# Patient Record
Sex: FEMALE | Race: WHITE | NOT HISPANIC OR LATINO | Employment: FULL TIME | URBAN - METROPOLITAN AREA
[De-identification: names, ages, dates, MRNs, and addresses within clinical notes are randomized per-mention and may not be internally consistent; named-entity substitution may affect disease eponyms.]

---

## 2024-07-26 ENCOUNTER — LAB (OUTPATIENT)
Dept: LAB | Facility: LAB | Age: 59
End: 2024-07-26
Payer: COMMERCIAL

## 2024-07-26 ENCOUNTER — APPOINTMENT (OUTPATIENT)
Dept: RHEUMATOLOGY | Facility: CLINIC | Age: 59
End: 2024-07-26
Payer: COMMERCIAL

## 2024-07-26 VITALS — SYSTOLIC BLOOD PRESSURE: 138 MMHG | WEIGHT: 293 LBS | DIASTOLIC BLOOD PRESSURE: 73 MMHG

## 2024-07-26 DIAGNOSIS — L40.50 PSORIATIC ARTHRITIS (MULTI): ICD-10-CM

## 2024-07-26 DIAGNOSIS — L40.50 PSORIATIC ARTHRITIS (MULTI): Primary | ICD-10-CM

## 2024-07-26 LAB
ALBUMIN SERPL BCP-MCNC: 3.8 G/DL (ref 3.4–5)
ALP SERPL-CCNC: 108 U/L (ref 33–136)
ALT SERPL W P-5'-P-CCNC: 23 U/L (ref 7–45)
ANION GAP SERPL CALC-SCNC: 13 MMOL/L (ref 10–20)
AST SERPL W P-5'-P-CCNC: 29 U/L (ref 9–39)
BILIRUB SERPL-MCNC: 0.5 MG/DL (ref 0–1.2)
BUN SERPL-MCNC: 12 MG/DL (ref 6–23)
CALCIUM SERPL-MCNC: 9 MG/DL (ref 8.6–10.6)
CCP IGG SERPL-ACNC: <1 U/ML
CHLORIDE SERPL-SCNC: 102 MMOL/L (ref 98–107)
CO2 SERPL-SCNC: 32 MMOL/L (ref 21–32)
CREAT SERPL-MCNC: 0.71 MG/DL (ref 0.5–1.05)
CRP SERPL-MCNC: 1.84 MG/DL
EGFRCR SERPLBLD CKD-EPI 2021: >90 ML/MIN/1.73M*2
ERYTHROCYTE [DISTWIDTH] IN BLOOD BY AUTOMATED COUNT: 15.6 % (ref 11.5–14.5)
ERYTHROCYTE [SEDIMENTATION RATE] IN BLOOD BY WESTERGREN METHOD: 41 MM/H (ref 0–30)
GLUCOSE SERPL-MCNC: 127 MG/DL (ref 74–99)
HBV CORE IGM SER QL: NONREACTIVE
HBV SURFACE AG SERPL QL IA: NONREACTIVE
HCT VFR BLD AUTO: 43.9 % (ref 36–46)
HCV AB SER QL: NONREACTIVE
HGB BLD-MCNC: 14.1 G/DL (ref 12–16)
MCH RBC QN AUTO: 29.3 PG (ref 26–34)
MCHC RBC AUTO-ENTMCNC: 32.1 G/DL (ref 32–36)
MCV RBC AUTO: 91 FL (ref 80–100)
NRBC BLD-RTO: 0 /100 WBCS (ref 0–0)
PLATELET # BLD AUTO: 292 X10*3/UL (ref 150–450)
POTASSIUM SERPL-SCNC: 4.2 MMOL/L (ref 3.5–5.3)
PROT SERPL-MCNC: 7.1 G/DL (ref 6.4–8.2)
RBC # BLD AUTO: 4.81 X10*6/UL (ref 4–5.2)
RHEUMATOID FACT SER NEPH-ACNC: <10 IU/ML (ref 0–15)
SODIUM SERPL-SCNC: 143 MMOL/L (ref 136–145)
URATE SERPL-MCNC: 6 MG/DL (ref 2.3–6.7)
WBC # BLD AUTO: 7.7 X10*3/UL (ref 4.4–11.3)

## 2024-07-26 PROCEDURE — 80053 COMPREHEN METABOLIC PANEL: CPT

## 2024-07-26 PROCEDURE — 99204 OFFICE O/P NEW MOD 45 MIN: CPT | Performed by: INTERNAL MEDICINE

## 2024-07-26 PROCEDURE — 86705 HEP B CORE ANTIBODY IGM: CPT

## 2024-07-26 PROCEDURE — 85027 COMPLETE CBC AUTOMATED: CPT

## 2024-07-26 PROCEDURE — 36415 COLL VENOUS BLD VENIPUNCTURE: CPT

## 2024-07-26 PROCEDURE — 1036F TOBACCO NON-USER: CPT | Performed by: INTERNAL MEDICINE

## 2024-07-26 PROCEDURE — 86803 HEPATITIS C AB TEST: CPT

## 2024-07-26 PROCEDURE — 85652 RBC SED RATE AUTOMATED: CPT

## 2024-07-26 PROCEDURE — 86140 C-REACTIVE PROTEIN: CPT

## 2024-07-26 PROCEDURE — 87340 HEPATITIS B SURFACE AG IA: CPT

## 2024-07-26 PROCEDURE — 84550 ASSAY OF BLOOD/URIC ACID: CPT

## 2024-07-26 PROCEDURE — 86481 TB AG RESPONSE T-CELL SUSP: CPT

## 2024-07-26 PROCEDURE — 86200 CCP ANTIBODY: CPT

## 2024-07-26 PROCEDURE — 86431 RHEUMATOID FACTOR QUANT: CPT

## 2024-07-26 RX ORDER — METOPROLOL SUCCINATE 50 MG/1
50 TABLET, EXTENDED RELEASE ORAL DAILY
COMMUNITY

## 2024-07-26 RX ORDER — FUROSEMIDE 20 MG/1
20 TABLET ORAL DAILY
COMMUNITY

## 2024-07-26 RX ORDER — LEVOTHYROXINE SODIUM 125 UG/1
125 TABLET ORAL DAILY
COMMUNITY

## 2024-07-26 RX ORDER — FOLIC ACID 1 MG/1
1 TABLET ORAL DAILY
Qty: 30 TABLET | Refills: 1 | Status: SHIPPED | OUTPATIENT
Start: 2024-07-26 | End: 2024-08-25

## 2024-07-26 RX ORDER — ESCITALOPRAM OXALATE 20 MG/1
20 TABLET ORAL DAILY
COMMUNITY

## 2024-07-26 RX ORDER — METHOTREXATE 2.5 MG/1
15 TABLET ORAL
Qty: 24 TABLET | Refills: 1 | Status: SHIPPED | OUTPATIENT
Start: 2024-07-28 | End: 2024-08-27

## 2024-07-26 NOTE — PROGRESS NOTES
Subjective  . Mercedez Ellington is a 59 y.o. female who presents for New Patient Visit (Pain in joints ).    HPI.  59-year-old female with history of psoriasis, rosacea, HTN, hypothyroidism, obesity, endometrial CA s/p hysterectomy, arthritis s/p bilateral TKR and s/p right carpal tunnel decompression surgery presented for psoriatic arthritis evaluation.    She was diagnosed with psoriasis about 5 years ago.  She is using topical steroids and lotions.    She reports pain and stiffness in the hands that is worse in the morning for couple hours.  She reports pain in the hands up to 7/10 at times.  Sometimes she feels swelling of the fingers.  He reports mid back/lower back pain which is worse in the morning and at night.  Pain started about 2 years ago.  She also notes soreness and stiffness around the periarticular tissues and ligaments of the knees for the past 10 years.  She underwent right knee arthroplasty in March 2023 and left knee arthroplasty in August 2023.  However she has not noticed improvement of soft tissue soreness and swelling.    She stated that she noticed significant joint pain relief when she took prednisone for bronchitis about 3 months ago.     Social history: She is .  She does not smoke or drink.  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  Family history: Mother has had hypertension and abdominal hernia.  Father has diabetes.  A distant cousin has rheumatoid arthritis.    Review of Systems   All other systems reviewed and are negative.    Objective     Blood pressure 138/73, weight 147 kg (325 lb).    Physical Exam  Gen. AAO x3, NAD.  HEENT: No pallor or icterus, PERRLA, EOMI. Oropharynx is clear. MM moist,Parotid glands  not enlarged. No cervical lymphadenopathy .  Skin: Dry scaly and erythematous plaque at the right lateral dorsum of the hand.  Psoriatic rash involving the right elbow.    Heart: S1, S2/ RRR. No murmurs or gallops.  Lungs: CTA B.  Abdomen: Soft,  NT/ND, BS regular.  MSK: Bilateral DIP joint prominence with tenderness.  Right fourth and fifth finger PIP joint with tenderness.  Bilateral wrist with mild tenderness upon flexion and extension.  Right wrist with lateral joint line tenderness.  Bilateral shoulders with good range of motion.  Lumbar spine with tenderness.  SI joint without tenderness.  Bilateral ankle with lateral joint line tenderness.  Left first MTP with tenderness.    Neuro: CN II-XII intact. Sensation to touch intact.Strength 5/5 throughout. DTR 2+ and symmetrical.  Psych:Appropriate mood and behavior  EXT: No edema    Assessment/Plan . 59-year-old female with history of psoriasis, rosacea, HTN, hypothyroidism, obesity, endometrial CA s/p hysterectomy, arthritis s/p bilateral TKR and s/p right carpal tunnel decompression surgery presented with polyarthritis.    #1: Psoriatic arthritis.  She also has features of osteoarthritis.  -Patient was counseled regarding the diagnosis, management and outcomes in length.  -Obtain labs and x-ray of the hands and wrist.  -Begin methotrexate 15 mg/week.  Side effect discussed in length.  -Begin folic acid 1 mg daily.    Follow-up in 2 months.     This note was partially generated using the Dragon Voice recognition system. There may be some incorrect wording, spelling and/or spelling errors or punctuation errors that were not corrected prior to committing the note to the medical record.  Problem List Items Addressed This Visit    None  Visit Diagnoses       Psoriatic arthritis (Multi)    -  Primary    Relevant Medications    methotrexate (Trexall) 2.5 mg tablet (Start on 7/28/2024)    folic acid (Folvite) 1 mg tablet    Other Relevant Orders    Comprehensive Metabolic Panel    Citrulline Antibody, IgG    CBC    C-Reactive Protein    Hepatitis B Core Antibody, IgM    Hepatitis B Surface Antigen    Hepatitis C Antibody    T-Spot TB    Uric Acid    Sedimentation Rate    Rheumatoid Factor    XR hand 3+ views  bilateral    XR wrist 3+ views bilateral                 Active Ambulatory Problems     Diagnosis Date Noted    No Active Ambulatory Problems     Resolved Ambulatory Problems     Diagnosis Date Noted    No Resolved Ambulatory Problems     Past Medical History:   Diagnosis Date    Anxiety and depression     Endometrial carcinoma (Multi)     Hypertension     Hypothyroidism     Psoriasis        Family History   Problem Relation Name Age of Onset    Hypertension Mother      Diabetes Father         Past Surgical History:   Procedure Laterality Date    CARPAL TUNNEL RELEASE      CHOLECYSTECTOMY      HYSTERECTOMY      KNEE ARTHROPLASTY         Social History     Tobacco Use   Smoking Status Never   Smokeless Tobacco Never       Allergies  Ciprofloxacin, Morphine (pf), and Penicillin    Current Meds  Current Outpatient Medications   Medication Instructions    escitalopram (LEXAPRO) 20 mg, oral, Daily    folic acid (FOLVITE) 1 mg, oral, Daily    furosemide (LASIX) 20 mg, Daily    levothyroxine (SYNTHROID, LEVOXYL) 125 mcg, oral, Daily, Take on an empty stomach at the same time each day, either 30 to 60 minutes prior to breakfast    [START ON 7/28/2024] methotrexate (TREXALL) 15 mg, oral, Once Weekly, Follow directions carefully, and ask to explain any part you do not understand. Take exactly as directed.    metoprolol succinate XL (TOPROL-XL) 50 mg, oral, Daily, Do not crush or chew.          Dev Davidson MD

## 2024-07-26 NOTE — PATIENT INSTRUCTIONS
Take methotrexate 6 pills/week.  Take folic acid 1 mg daily.  Call me if any question.  Follow-up in 2 months.

## 2024-07-28 LAB
NIL(NEG) CONTROL SPOT COUNT: NORMAL
PANEL A SPOT COUNT: 0
PANEL B SPOT COUNT: 0
POS CONTROL SPOT COUNT: NORMAL
T-SPOT. TB INTERPRETATION: NEGATIVE

## 2024-09-14 DIAGNOSIS — L40.50 PSORIATIC ARTHRITIS (MULTI): ICD-10-CM

## 2024-09-16 RX ORDER — METHOTREXATE 2.5 MG/1
15 TABLET ORAL WEEKLY
Qty: 72 TABLET | Refills: 0 | Status: SHIPPED | OUTPATIENT
Start: 2024-09-16 | End: 2024-12-15

## 2024-09-25 ENCOUNTER — APPOINTMENT (OUTPATIENT)
Dept: RHEUMATOLOGY | Facility: CLINIC | Age: 59
End: 2024-09-25
Payer: COMMERCIAL

## 2024-09-25 VITALS
WEIGHT: 293 LBS | HEART RATE: 59 BPM | OXYGEN SATURATION: 93 % | BODY MASS INDEX: 45.99 KG/M2 | SYSTOLIC BLOOD PRESSURE: 136 MMHG | HEIGHT: 67 IN | DIASTOLIC BLOOD PRESSURE: 70 MMHG

## 2024-09-25 DIAGNOSIS — L40.50 PSORIATIC ARTHRITIS (MULTI): Primary | ICD-10-CM

## 2024-09-25 PROCEDURE — 3008F BODY MASS INDEX DOCD: CPT | Performed by: INTERNAL MEDICINE

## 2024-09-25 PROCEDURE — 1036F TOBACCO NON-USER: CPT | Performed by: INTERNAL MEDICINE

## 2024-09-25 PROCEDURE — 99213 OFFICE O/P EST LOW 20 MIN: CPT | Performed by: INTERNAL MEDICINE

## 2024-09-25 RX ORDER — FOLIC ACID 1 MG/1
1 TABLET ORAL
COMMUNITY
Start: 2024-08-29 | End: 2024-09-25 | Stop reason: SDUPTHER

## 2024-09-25 RX ORDER — METHOTREXATE 2.5 MG/1
15 TABLET ORAL WEEKLY
Qty: 72 TABLET | Refills: 0 | Status: SHIPPED | OUTPATIENT
Start: 2024-09-25 | End: 2024-12-24

## 2024-09-25 RX ORDER — FOLIC ACID 1 MG/1
1 TABLET ORAL
Qty: 90 TABLET | Refills: 0 | Status: SHIPPED | OUTPATIENT
Start: 2024-09-25 | End: 2024-12-24

## 2024-09-25 NOTE — PATIENT INSTRUCTIONS
Continue methotrexate 6 pills/week.  Take Advil as needed.  Call me if any question.  Follow-up in 2 months.

## 2024-09-25 NOTE — PROGRESS NOTES
"Subjective  . Mercedez Ellington is a 59 y.o. female who presents for Follow-up.    HPI. 59-year-old female with history of psoriasis, psoriatic arthritis, rosacea, HTN, hypothyroidism, obesity, endometrial CA s/p hysterectomy, arthritis s/p bilateral TKR and s/p right carpal tunnel decompression surgery presented for follow-up.    She states joint pain is better however still feels soreness of the CMC and DIP joints of both hands.  She is tolerating methotrexate.    Immunosuppression: Methotrexate. (7/26/2024).    Review of Systems   All other systems reviewed and are negative.    Objective     Blood pressure 136/70, pulse 59, height 1.702 m (5' 7\"), weight (!) 152 kg (335 lb), SpO2 93%.    Physical Exam.  Gen. AAO x3, NAD.  HEENT: No pallor or icterus, PERRLA, EOMI.  No cervical lymphadenopathy .  Skin: Dry scaly and erythematous plaque at the right lateral dorsum of the hand the right elbow.   Heart: S1, S2/ RRR.   Lungs: CTA B.  Abdomen: Soft, NT/ND.  MSK: Bilateral CMC slightly enlarged with tenderness.  Bilateral DIP joint with bony prominence and mild tenderness.  Bilateral wrist and elbows without swelling and tenderness.  Bilateral ankle and MTPs without swelling and tenderness.  Lumbar spine and SI joint without tenderness.    Neuro: Sensation to touch intact.Strength 5/5 throughout.   Psych:Appropriate mood and behavior  EXT: No edema    Assessment/Plan . 59-year-old female with history of psoriasis, psoriatic arthritis, rosacea, HTN, hypothyroidism, obesity, endometrial CA s/p hysterectomy, arthritis s/p bilateral TKR and s/p right carpal tunnel decompression surgery presented for follow-up.    #1: Psoriatic arthritis.  -Continue methotrexate 15 mg/week.  -Advil as needed.  -Labs.    Follow-up in 2 months.     This note was partially generated using the Dragon Voice recognition system. There may be some incorrect wording, spelling and/or spelling errors or punctuation errors that were not corrected prior to " committing the note to the medical record.      Problem List Items Addressed This Visit    None  Visit Diagnoses       Psoriatic arthritis (Multi)    -  Primary    Relevant Medications    methotrexate (Trexall) 2.5 mg tablet    folic acid (Folvite) 1 mg tablet    Other Relevant Orders    CBC    C-Reactive Protein    Sedimentation Rate    Hepatic Function Panel                 Active Ambulatory Problems     Diagnosis Date Noted    No Active Ambulatory Problems     Resolved Ambulatory Problems     Diagnosis Date Noted    No Resolved Ambulatory Problems     Past Medical History:   Diagnosis Date    Anxiety and depression     Endometrial carcinoma (Multi)     Hypertension     Hypothyroidism     Psoriasis        Family History   Problem Relation Name Age of Onset    Hypertension Mother      Diabetes Father         Past Surgical History:   Procedure Laterality Date    CARPAL TUNNEL RELEASE      CHOLECYSTECTOMY      HYSTERECTOMY      KNEE ARTHROPLASTY         Social History     Tobacco Use   Smoking Status Never   Smokeless Tobacco Never       Allergies  Ciprofloxacin, Morphine (pf), and Penicillin    Current Meds  Current Outpatient Medications   Medication Instructions    escitalopram (LEXAPRO) 20 mg, oral, Daily    folic acid (FOLVITE) 1 mg, oral, Daily (0630)    furosemide (LASIX) 20 mg, Daily    levothyroxine (SYNTHROID, LEVOXYL) 125 mcg, oral, Daily, Take on an empty stomach at the same time each day, either 30 to 60 minutes prior to breakfast    methotrexate (TREXALL) 15 mg, oral, Weekly    metoprolol succinate XL (TOPROL-XL) 50 mg, oral, Daily, Do not crush or chew.        Lab Results   Component Value Date    RF <10 07/26/2024    SEDRATE 41 (H) 07/26/2024    CRP 1.84 (H) 07/26/2024    URICACID 6.0 07/26/2024        Dev Davidson MD

## 2024-11-18 ENCOUNTER — HOSPITAL ENCOUNTER (OUTPATIENT)
Dept: RADIOLOGY | Facility: CLINIC | Age: 59
Discharge: HOME | End: 2024-11-18
Payer: COMMERCIAL

## 2024-11-18 DIAGNOSIS — L40.50 PSORIATIC ARTHRITIS (MULTI): ICD-10-CM

## 2024-11-18 PROCEDURE — 73110 X-RAY EXAM OF WRIST: CPT | Mod: 50

## 2024-11-18 PROCEDURE — 73130 X-RAY EXAM OF HAND: CPT | Mod: BILATERAL PROCEDURE | Performed by: RADIOLOGY

## 2024-11-18 PROCEDURE — 73130 X-RAY EXAM OF HAND: CPT | Mod: 50

## 2024-11-25 ENCOUNTER — APPOINTMENT (OUTPATIENT)
Dept: RHEUMATOLOGY | Facility: CLINIC | Age: 59
End: 2024-11-25
Payer: COMMERCIAL

## 2024-11-25 VITALS
SYSTOLIC BLOOD PRESSURE: 114 MMHG | DIASTOLIC BLOOD PRESSURE: 82 MMHG | HEIGHT: 67 IN | WEIGHT: 293 LBS | HEART RATE: 56 BPM | BODY MASS INDEX: 45.99 KG/M2

## 2024-11-25 DIAGNOSIS — L40.50 PSORIATIC ARTHRITIS (MULTI): Primary | ICD-10-CM

## 2024-11-25 PROCEDURE — 99213 OFFICE O/P EST LOW 20 MIN: CPT | Performed by: INTERNAL MEDICINE

## 2024-11-25 PROCEDURE — 3008F BODY MASS INDEX DOCD: CPT | Performed by: INTERNAL MEDICINE

## 2024-11-25 RX ORDER — CELECOXIB 200 MG/1
200 CAPSULE ORAL DAILY
Qty: 30 CAPSULE | Refills: 2 | Status: SHIPPED | OUTPATIENT
Start: 2024-11-25 | End: 2025-05-24

## 2024-11-25 NOTE — PROGRESS NOTES
"Subjective . Mercedez Ellington is a 59 y.o. female who presents for Follow-up (2 month follow up).    HPI.  59-year-old female with history of psoriasis, psoriatic arthritis, rosacea, HTN, hypothyroidism, obesity, endometrial CA s/p hysterectomy, arthritis s/p bilateral TKR and s/p right carpal tunnel decompression surgery presented for follow-up.     She reports pain in her spine, hips and CMC joints.  Left CMC joint hurts more than right.  Back and hip pain gets worse upon standing up, walking.  She states back pain feels like burning and achy most of the time.    Immunosuppression: Methotrexate.(7/26/2024).     X-ray of the hands: (11/2024).  Left hand: Redemonstration of mild arthritic changes of the 1st carpometacarpal  joint.    Right hand: Moderately advanced arthritis of the DIP joints of the 2nd, 3rd and  5th digits bilaterally.Redemonstration of arthritic changes of the 1st carpometacarpal  joints right side more severe than the left.    Review of Systems   All other systems reviewed and are negative.    Objective     Blood pressure 114/82, pulse 56, height 1.702 m (5' 7\"), weight (!) 151 kg (332 lb).    Physical Exam.  Gen. AAO x3, NAD.  HEENT: No pallor or icterus, PERRLA, EOMI. No cervical lymphadenopathy .  Skin:Dry scaly and erythematous plaque at the right lateral dorsum of the hand the right elbow .  Heart: S1, S2/ RRR.   Lungs: CTA B.  Abdomen: Soft, NT/ND.  MSK: Bilateral CMC squaring with positive grinding.  Right CMC with tenderness.  Bilateral DIP joints enlarged.  Bilateral middle finger DIP with mild tenderness.  Neck, lumbar spine without tenderness.  Bilateral SI joint with mild tenderness.  Bilateral Mandeep's negative.  Bilateral trochanteric bursa without tenderness.  Bilateral ankle without swelling and tenderness.    Neuro: Sensation to touch intact.Strength 5/5 throughout. .  Psych:Appropriate mood and behavior  EXT: No edema    Assessment/Plan . 59-year-old female with history of " psoriasis, psoriatic arthritis, rosacea, HTN, hypothyroidism, obesity, endometrial CA s/p hysterectomy, arthritis s/p bilateral TKR and s/p right carpal tunnel decompression surgery presented for follow-up.     #1: Psoriatic arthritis.  She also has features of degenerative arthritis.  -Not ready for Biologics.  -Begin Celebrex with food.  -Declined physical therapy referral.  Back exercises printout provided.  -Continue methotrexate.  -Weight loss discussed.    Follow-up in 3 months.     This note was partially generated using the Dragon Voice recognition system. There may be some incorrect wording, spelling and/or spelling errors or punctuation errors that were not corrected prior to committing the note to the medical record.              Problem List Items Addressed This Visit    None  Visit Diagnoses       Psoriatic arthritis (Multi)    -  Primary    Relevant Medications    celecoxib (CeleBREX) 200 mg capsule    Other Relevant Orders    C-Reactive Protein    CBC    Sedimentation Rate    HLAB27 Typing    XR lumbar spine 2-3 views    XR sacroiliac joints 3+ views                 Active Ambulatory Problems     Diagnosis Date Noted    No Active Ambulatory Problems     Resolved Ambulatory Problems     Diagnosis Date Noted    No Resolved Ambulatory Problems     Past Medical History:   Diagnosis Date    Anxiety and depression     Endometrial carcinoma (Multi)     Hypertension     Hypothyroidism     Psoriasis        Family History   Problem Relation Name Age of Onset    Hypertension Mother      Diabetes Father         Past Surgical History:   Procedure Laterality Date    CARPAL TUNNEL RELEASE      CHOLECYSTECTOMY      HYSTERECTOMY      KNEE ARTHROPLASTY         Social History     Tobacco Use   Smoking Status Never   Smokeless Tobacco Never       Allergies  Ciprofloxacin, Morphine (pf), and Penicillin    Current Meds  Current Outpatient Medications   Medication Instructions    celecoxib (CELEBREX) 200 mg, oral, Daily     escitalopram (LEXAPRO) 20 mg, Daily    folic acid (FOLVITE) 1 mg, oral, Daily (0630)    furosemide (LASIX) 20 mg, Daily    levothyroxine (SYNTHROID, LEVOXYL) 125 mcg, Daily    methotrexate (TREXALL) 15 mg, oral, Weekly    metoprolol succinate XL (TOPROL-XL) 50 mg, Daily        Lab Results   Component Value Date    RF <10 07/26/2024    SEDRATE 41 (H) 07/26/2024    CRP 1.84 (H) 07/26/2024    URICACID 6.0 07/26/2024             Dev Davidson MD

## 2024-11-25 NOTE — PATIENT INSTRUCTIONS
Take Celebrex with food.  Begin back exercises.  Take Tylenol as discussed.  Continue methotrexate as prescribed.  Conceded to join weight loss program.  Call me if any question.  Follow-up in 3 months.

## 2024-12-09 DIAGNOSIS — L40.50 PSORIATIC ARTHRITIS (MULTI): ICD-10-CM

## 2024-12-09 RX ORDER — METHOTREXATE 2.5 MG/1
15 TABLET ORAL WEEKLY
Qty: 72 TABLET | Refills: 0 | Status: SHIPPED | OUTPATIENT
Start: 2024-12-09 | End: 2025-03-09

## 2024-12-09 RX ORDER — FOLIC ACID 1 MG/1
1 TABLET ORAL
Qty: 90 TABLET | Refills: 0 | Status: SHIPPED | OUTPATIENT
Start: 2024-12-09 | End: 2025-03-09

## 2025-01-11 ENCOUNTER — HOSPITAL ENCOUNTER (EMERGENCY)
Facility: HOSPITAL | Age: 60
Discharge: HOME | End: 2025-01-12
Attending: EMERGENCY MEDICINE
Payer: COMMERCIAL

## 2025-01-11 ENCOUNTER — APPOINTMENT (OUTPATIENT)
Dept: RADIOLOGY | Facility: HOSPITAL | Age: 60
End: 2025-01-11
Payer: COMMERCIAL

## 2025-01-11 DIAGNOSIS — N39.0 URINARY TRACT INFECTION WITHOUT HEMATURIA, SITE UNSPECIFIED: ICD-10-CM

## 2025-01-11 DIAGNOSIS — N17.9 ACUTE KIDNEY INJURY (CMS-HCC): ICD-10-CM

## 2025-01-11 DIAGNOSIS — L03.115 CELLULITIS OF RIGHT LOWER EXTREMITY: Primary | ICD-10-CM

## 2025-01-11 LAB
ALBUMIN SERPL BCP-MCNC: 3.5 G/DL (ref 3.4–5)
ALP SERPL-CCNC: 97 U/L (ref 33–136)
ALT SERPL W P-5'-P-CCNC: 28 U/L (ref 7–45)
ANION GAP SERPL CALC-SCNC: 10 MMOL/L (ref 10–20)
APPEARANCE UR: ABNORMAL
AST SERPL W P-5'-P-CCNC: 42 U/L (ref 9–39)
BACTERIA #/AREA URNS AUTO: ABNORMAL /HPF
BASOPHILS # BLD AUTO: 0.05 X10*3/UL (ref 0–0.1)
BASOPHILS NFR BLD AUTO: 0.3 %
BILIRUB SERPL-MCNC: 0.8 MG/DL (ref 0–1.2)
BILIRUB UR STRIP.AUTO-MCNC: ABNORMAL MG/DL
BUN SERPL-MCNC: 20 MG/DL (ref 6–23)
CALCIUM SERPL-MCNC: 8.4 MG/DL (ref 8.6–10.3)
CHLORIDE SERPL-SCNC: 99 MMOL/L (ref 98–107)
CO2 SERPL-SCNC: 27 MMOL/L (ref 21–32)
COLOR UR: ABNORMAL
CREAT SERPL-MCNC: 1.38 MG/DL (ref 0.5–1.05)
EGFRCR SERPLBLD CKD-EPI 2021: 43 ML/MIN/1.73M*2
EOSINOPHIL # BLD AUTO: 0.1 X10*3/UL (ref 0–0.7)
EOSINOPHIL NFR BLD AUTO: 0.6 %
ERYTHROCYTE [DISTWIDTH] IN BLOOD BY AUTOMATED COUNT: 16.2 % (ref 11.5–14.5)
GLUCOSE SERPL-MCNC: 148 MG/DL (ref 74–99)
GLUCOSE UR STRIP.AUTO-MCNC: NORMAL MG/DL
HCT VFR BLD AUTO: 43.8 % (ref 36–46)
HGB BLD-MCNC: 14.6 G/DL (ref 12–16)
HYALINE CASTS #/AREA URNS AUTO: ABNORMAL /LPF
IMM GRANULOCYTES # BLD AUTO: 0.15 X10*3/UL (ref 0–0.7)
IMM GRANULOCYTES NFR BLD AUTO: 0.9 % (ref 0–0.9)
KETONES UR STRIP.AUTO-MCNC: NEGATIVE MG/DL
LEUKOCYTE ESTERASE UR QL STRIP.AUTO: ABNORMAL
LIPASE SERPL-CCNC: 6 U/L (ref 9–82)
LYMPHOCYTES # BLD AUTO: 0.64 X10*3/UL (ref 1.2–4.8)
LYMPHOCYTES NFR BLD AUTO: 4 %
MCH RBC QN AUTO: 30.7 PG (ref 26–34)
MCHC RBC AUTO-ENTMCNC: 33.3 G/DL (ref 32–36)
MCV RBC AUTO: 92 FL (ref 80–100)
MONOCYTES # BLD AUTO: 0.84 X10*3/UL (ref 0.1–1)
MONOCYTES NFR BLD AUTO: 5.3 %
MUCOUS THREADS #/AREA URNS AUTO: ABNORMAL /LPF
NEUTROPHILS # BLD AUTO: 14.19 X10*3/UL (ref 1.2–7.7)
NEUTROPHILS NFR BLD AUTO: 88.9 %
NITRITE UR QL STRIP.AUTO: NEGATIVE
NRBC BLD-RTO: 0 /100 WBCS (ref 0–0)
PH UR STRIP.AUTO: 5.5 [PH]
PLATELET # BLD AUTO: 202 X10*3/UL (ref 150–450)
POTASSIUM SERPL-SCNC: 4.1 MMOL/L (ref 3.5–5.3)
PROT SERPL-MCNC: 6.9 G/DL (ref 6.4–8.2)
PROT UR STRIP.AUTO-MCNC: ABNORMAL MG/DL
RBC # BLD AUTO: 4.76 X10*6/UL (ref 4–5.2)
RBC # UR STRIP.AUTO: ABNORMAL /UL
RBC #/AREA URNS AUTO: >20 /HPF
SODIUM SERPL-SCNC: 132 MMOL/L (ref 136–145)
SP GR UR STRIP.AUTO: 1.03
SQUAMOUS #/AREA URNS AUTO: ABNORMAL /HPF
UROBILINOGEN UR STRIP.AUTO-MCNC: ABNORMAL MG/DL
WBC # BLD AUTO: 16 X10*3/UL (ref 4.4–11.3)
WBC #/AREA URNS AUTO: >50 /HPF
WBC CLUMPS #/AREA URNS AUTO: ABNORMAL /HPF

## 2025-01-11 PROCEDURE — 99285 EMERGENCY DEPT VISIT HI MDM: CPT | Mod: 25 | Performed by: EMERGENCY MEDICINE

## 2025-01-11 PROCEDURE — 93971 EXTREMITY STUDY: CPT

## 2025-01-11 PROCEDURE — 74177 CT ABD & PELVIS W/CONTRAST: CPT

## 2025-01-11 PROCEDURE — 93971 EXTREMITY STUDY: CPT | Mod: FOREIGN READ | Performed by: RADIOLOGY

## 2025-01-11 PROCEDURE — 2500000004 HC RX 250 GENERAL PHARMACY W/ HCPCS (ALT 636 FOR OP/ED): Performed by: EMERGENCY MEDICINE

## 2025-01-11 PROCEDURE — 80053 COMPREHEN METABOLIC PANEL: CPT | Performed by: EMERGENCY MEDICINE

## 2025-01-11 PROCEDURE — 81001 URINALYSIS AUTO W/SCOPE: CPT | Performed by: EMERGENCY MEDICINE

## 2025-01-11 PROCEDURE — 2500000001 HC RX 250 WO HCPCS SELF ADMINISTERED DRUGS (ALT 637 FOR MEDICARE OP): Performed by: EMERGENCY MEDICINE

## 2025-01-11 PROCEDURE — 2550000001 HC RX 255 CONTRASTS: Performed by: EMERGENCY MEDICINE

## 2025-01-11 PROCEDURE — 85025 COMPLETE CBC W/AUTO DIFF WBC: CPT | Performed by: EMERGENCY MEDICINE

## 2025-01-11 PROCEDURE — 36415 COLL VENOUS BLD VENIPUNCTURE: CPT | Performed by: EMERGENCY MEDICINE

## 2025-01-11 PROCEDURE — 74177 CT ABD & PELVIS W/CONTRAST: CPT | Mod: FOREIGN READ | Performed by: RADIOLOGY

## 2025-01-11 PROCEDURE — 83690 ASSAY OF LIPASE: CPT | Performed by: EMERGENCY MEDICINE

## 2025-01-11 PROCEDURE — 87086 URINE CULTURE/COLONY COUNT: CPT | Mod: PORLAB | Performed by: EMERGENCY MEDICINE

## 2025-01-11 RX ORDER — ACETAMINOPHEN 325 MG/1
975 TABLET ORAL ONCE
Status: COMPLETED | OUTPATIENT
Start: 2025-01-11 | End: 2025-01-11

## 2025-01-11 RX ADMIN — IOHEXOL 75 ML: 350 INJECTION, SOLUTION INTRAVENOUS at 23:02

## 2025-01-11 RX ADMIN — ACETAMINOPHEN 975 MG: 325 TABLET ORAL at 21:43

## 2025-01-11 RX ADMIN — SODIUM CHLORIDE 1000 ML: 9 INJECTION, SOLUTION INTRAVENOUS at 23:12

## 2025-01-11 ASSESSMENT — LIFESTYLE VARIABLES
HAVE YOU EVER FELT YOU SHOULD CUT DOWN ON YOUR DRINKING: NO
EVER HAD A DRINK FIRST THING IN THE MORNING TO STEADY YOUR NERVES TO GET RID OF A HANGOVER: NO
TOTAL SCORE: 0
EVER FELT BAD OR GUILTY ABOUT YOUR DRINKING: NO
HAVE PEOPLE ANNOYED YOU BY CRITICIZING YOUR DRINKING: NO

## 2025-01-11 ASSESSMENT — PAIN DESCRIPTION - DESCRIPTORS: DESCRIPTORS: BURNING;TIGHTNESS

## 2025-01-11 ASSESSMENT — PAIN DESCRIPTION - PAIN TYPE: TYPE: ACUTE PAIN

## 2025-01-11 ASSESSMENT — COLUMBIA-SUICIDE SEVERITY RATING SCALE - C-SSRS
6. HAVE YOU EVER DONE ANYTHING, STARTED TO DO ANYTHING, OR PREPARED TO DO ANYTHING TO END YOUR LIFE?: NO
1. IN THE PAST MONTH, HAVE YOU WISHED YOU WERE DEAD OR WISHED YOU COULD GO TO SLEEP AND NOT WAKE UP?: NO
2. HAVE YOU ACTUALLY HAD ANY THOUGHTS OF KILLING YOURSELF?: NO

## 2025-01-11 ASSESSMENT — PAIN SCALES - GENERAL: PAINLEVEL_OUTOF10: 6

## 2025-01-11 ASSESSMENT — PAIN - FUNCTIONAL ASSESSMENT: PAIN_FUNCTIONAL_ASSESSMENT: 0-10

## 2025-01-11 ASSESSMENT — PAIN DESCRIPTION - LOCATION: LOCATION: LEG

## 2025-01-11 ASSESSMENT — PAIN DESCRIPTION - FREQUENCY: FREQUENCY: CONSTANT/CONTINUOUS

## 2025-01-12 VITALS
BODY MASS INDEX: 45.99 KG/M2 | TEMPERATURE: 97 F | HEART RATE: 60 BPM | OXYGEN SATURATION: 95 % | HEIGHT: 67 IN | DIASTOLIC BLOOD PRESSURE: 65 MMHG | RESPIRATION RATE: 16 BRPM | SYSTOLIC BLOOD PRESSURE: 127 MMHG | WEIGHT: 293 LBS

## 2025-01-12 LAB — HOLD SPECIMEN: NORMAL

## 2025-01-12 PROCEDURE — 2500000004 HC RX 250 GENERAL PHARMACY W/ HCPCS (ALT 636 FOR OP/ED): Performed by: EMERGENCY MEDICINE

## 2025-01-12 PROCEDURE — 2500000002 HC RX 250 W HCPCS SELF ADMINISTERED DRUGS (ALT 637 FOR MEDICARE OP, ALT 636 FOR OP/ED): Performed by: EMERGENCY MEDICINE

## 2025-01-12 PROCEDURE — 96365 THER/PROPH/DIAG IV INF INIT: CPT

## 2025-01-12 RX ORDER — SULFAMETHOXAZOLE AND TRIMETHOPRIM 800; 160 MG/1; MG/1
1 TABLET ORAL ONCE
Status: COMPLETED | OUTPATIENT
Start: 2025-01-12 | End: 2025-01-12

## 2025-01-12 RX ORDER — CEPHALEXIN 500 MG/1
500 CAPSULE ORAL 2 TIMES DAILY
Qty: 20 CAPSULE | Refills: 0 | Status: SHIPPED | OUTPATIENT
Start: 2025-01-12 | End: 2025-01-22

## 2025-01-12 RX ORDER — SULFAMETHOXAZOLE AND TRIMETHOPRIM 800; 160 MG/1; MG/1
1 TABLET ORAL 2 TIMES DAILY
Qty: 20 TABLET | Refills: 0 | Status: SHIPPED | OUTPATIENT
Start: 2025-01-12 | End: 2025-01-22

## 2025-01-12 RX ORDER — CEFTRIAXONE 2 G/50ML
2 INJECTION, SOLUTION INTRAVENOUS ONCE
Status: COMPLETED | OUTPATIENT
Start: 2025-01-12 | End: 2025-01-12

## 2025-01-12 RX ADMIN — SULFAMETHOXAZOLE AND TRIMETHOPRIM 1 TABLET: 800; 160 TABLET ORAL at 03:22

## 2025-01-12 RX ADMIN — CEFTRIAXONE SODIUM 2 G: 2 INJECTION, SOLUTION INTRAVENOUS at 01:30

## 2025-01-12 NOTE — ED TRIAGE NOTES
"Pt. to ED c/o right sided groin tenderness/soreness, as well as right leg pain.  Pt. stated that last night her leg felt like it was \"on fire,\" and today her leg is discolored--purplish/red, slightly swollen and warm to the touch. Pt. reports no hx of blood clots.  "

## 2025-01-12 NOTE — ED PROVIDER NOTES
HPI   Chief Complaint   Patient presents with    Right Groin Pain; Leg Pain       HPI  HISTORY OF PRESENT ILLNESS:  Patient is a 60-year-old female presenting to the emergency department with right lower quadrant abdominal pain and leg pain.  The patient states that she denies any injuries.  She started to notice pain in her right lower quadrant rating to the groin.  It felt like everything was on fire.  She will today woke up with her right lower extremity discolored.  Patient has not had prior blood clots.    Past Medical History: Psoriasis, psoriatic arthritis with the patient currently on Celebrex and methotrexate, rosacea, hypertension, hypothyroidism, obesity, endometrial cancer status post hysterectomy, arthritis status post bilateral knee replacement, carpal tunnel surgery  Family History: family history not pertinent to presenting problem or chief complaint  Social History: Denies current cigarette smoking or EtOH use    __________________________________________________________  PHYSICAL EXAM:    Appearance:  female, appears stated age, nontoxic-appearing, alert, oriented , cooperative   Skin: Purple discoloration to the right anterior calf region, no evidence of cellulitis, discharge.  Intact,  dry skin, no lesions, rash, petechiae or purpura.   Eyes: PERRLA, EOMs intact,  Conjunctiva pink with no redness or exudates.    HENT: Normocephalic, atraumatic. Nares patent   Neck: Supple. Trachea at midline.   Pulmonary: Lung sounds are clear bilaterally.  There is no rales, rhonchi, or wheezing.  Cardiac: Regular rate and rhythm, no rubs, murmurs, or gallops. No JVD,   Abdomen: Abdomen is soft, nontender, and nondistended.  No palpable organomegaly.  No rebound or guarding.  No CVA tenderness. Nonsurgical abdomen  Genitourinary: Exam deferred.  Musculoskeletal: no edema, pain, cyanosis, or deformity in extremities. Pulses full and equal.   Neurological:  Cranial nerves are grossly intact, grossly  normal sensation, no weakness, no focal findings identified.    __________________________________________________________  MEDICAL DECISION MAKING:    Patient was seen and examined. Differential diagnosis for groin pain and abdominal pain includes cellulitis, purpura, hernia, DVT.  Patient has been having right lower quadrant pain and groin pain.  The rash appears more purple discolored as opposed to a cellulitis.  Patient denies any history of trauma or prior DVT or PE.  Will obtain labs in addition to CT.  Patient labs that show leukocytosis 16.  Did have a JOSE with a creatinine 1.38.  Patient urine also appeared to have infection.  Was started on ceftriaxone. Patient ultrasound was negative for a DVT.  There was signs of a lymph node that was enlarged, likely reactive to ongoing infection.  The patient CT imaging was overall unremarkable.  Incidental hiatal hernia.  Results discussed with the patient.  Did discuss the creatinine elevation.  I offered admission with the JOSE and white count for presumed UTI and cellulitis.  Patient would prefer outpatient follow-up.  Patient was given double coverage with Bactrim and Keflex.  First dose of Bactrim was given in addition to the IV ceftriaxone.  Discussed return precaution need for primary care follow-up.  Patient verbalized understanding, agreed to plan, the patient is discharged home        Chronic Medical Conditions Significantly Affecting Care: Psoriasis, psoriatic arthritis, rosacea, hypertension, hypothyroidism, obesity, endometrial cancer status post hysterectomy, arthritis status post bilateral knee replacement, carpal tunnel surgery    External Records Reviewed: I reviewed recent and relevant outside records including: Consultant note, rheumatology from November 25, 2024    Maged Carrington  Emergency Medicine      Patient History   Past Medical History:   Diagnosis Date    Anxiety and depression     Endometrial carcinoma (Multi)     Hypertension      Hypothyroidism     Psoriasis      Past Surgical History:   Procedure Laterality Date    CARPAL TUNNEL RELEASE      CHOLECYSTECTOMY      HYSTERECTOMY      KNEE ARTHROPLASTY       Family History   Problem Relation Name Age of Onset    Hypertension Mother      Diabetes Father       Social History     Tobacco Use    Smoking status: Never    Smokeless tobacco: Never   Vaping Use    Vaping status: Never Used   Substance Use Topics    Alcohol use: Not Currently    Drug use: Never       Physical Exam   ED Triage Vitals [01/11/25 2033]   Temperature Heart Rate Respirations BP   36.1 °C (97 °F) 63 18 111/64      Pulse Ox Temp Source Heart Rate Source Patient Position   95 % Temporal Monitor --      BP Location FiO2 (%)     -- --       Physical Exam      ED Course & MDM   ED Course as of 01/14/25 0349   Sun Jan 12, 2025   0124 Bacteria, Urine(!): 1+ [WJ]      ED Course User Index  [WJ] Maged Carrington DO         Diagnoses as of 01/14/25 0349   Cellulitis of right lower extremity   Urinary tract infection without hematuria, site unspecified   Acute kidney injury (CMS-HCC)                 No data recorded     [place] Coma Scale Score: 15 (01/11/25 2041 : Esther Schaffer RN)                           Medical Decision Making      Procedure  Procedures     Maged Carrington DO  01/14/25 0351

## 2025-01-14 LAB — BACTERIA UR CULT: ABNORMAL

## 2025-01-15 ENCOUNTER — TELEPHONE (OUTPATIENT)
Dept: PHARMACY | Facility: HOSPITAL | Age: 60
End: 2025-01-15
Payer: COMMERCIAL

## 2025-01-15 NOTE — PROGRESS NOTES
EDPD Note: Rapid Result Review    I reviewed Mercedez Ellington 's chart regarding a positive urine culture/result that was taken during their recent emergency room visit. The patient was not told about these results prior to leaving the emergency department. Therefore, patient was contacted and given appropriate education.     Patient presented to the ED 1/11 with symptoms of right groin and leg pain and discolored (purple) right lower extremity, assumed to be cellulitis however diagnosis was uncertain if infections or not. DVT not found on ultrasound. Additionally had elevated WBC and creatinine, diagnosed with JOSE. No noted urinary symptoms, UA indicative of bacteriuria. Patient was discharged with cephalexin 500 mg twice daily x 10 days and Bactrim DS twice daily x 10 days for dual coverage of bacteriuria and assumed cellulitis. No cultures taken from leg. No characteristics of rash given. At time of this call, patient confirms that's she is not experiencing any urinary symptoms and was not at the time of the ED visit. She states that her groin pain has completely resolved. She states her leg pain is still present but improved. The purple rash has improved in color and size and she has kept it covered with gauze. Without culture and sensitivity of leg rash, unable to confidently discontinue either agent. Based on improvement, no changes needed at this time. Patient has return precautions to the ED and plans to schedule PCP follow-up if needed.    Susceptibility data from last 90 days.  Collected Specimen Info Organism Ampicillin Cefazolin Cefazolin (uncomplicated UTIs only) Ciprofloxacin Gentamicin Nitrofurantoin Piperacillin/Tazobactam Trimethoprim/Sulfamethoxazole   01/11/25 Urine from Clean Catch/Voided Escherichia coli  S  S  S  S  S  S  S  S     Admission on 01/11/2025, Discharged on 01/12/2025   Component Date Value Ref Range Status    Lipase 01/11/2025 6 (L)  9 - 82 U/L Final    Glucose 01/11/2025 148 (H)  74 -  99 mg/dL Final    Sodium 01/11/2025 132 (L)  136 - 145 mmol/L Final    Potassium 01/11/2025 4.1  3.5 - 5.3 mmol/L Final    MILD HEMOLYSIS DETECTED. The result may be falsely elevated due to hemolysis or other interferents. Clinical correlation is recommended. Repeat testing may be considered.    Chloride 01/11/2025 99  98 - 107 mmol/L Final    Bicarbonate 01/11/2025 27  21 - 32 mmol/L Final    Anion Gap 01/11/2025 10  10 - 20 mmol/L Final    Urea Nitrogen 01/11/2025 20  6 - 23 mg/dL Final    Creatinine 01/11/2025 1.38 (H)  0.50 - 1.05 mg/dL Final    eGFR 01/11/2025 43 (L)  >60 mL/min/1.73m*2 Final    Calculations of estimated GFR are performed using the 2021 CKD-EPI Study Refit equation without the race variable for the IDMS-Traceable creatinine methods.  https://jasn.asnjournals.org/content/early/2021/09/22/ASN.9212272625    Calcium 01/11/2025 8.4 (L)  8.6 - 10.3 mg/dL Final    Albumin 01/11/2025 3.5  3.4 - 5.0 g/dL Final    Alkaline Phosphatase 01/11/2025 97  33 - 136 U/L Final    Total Protein 01/11/2025 6.9  6.4 - 8.2 g/dL Final    AST 01/11/2025 42 (H)  9 - 39 U/L Final    MILD HEMOLYSIS DETECTED. The result may be falsely elevated due to hemolysis or other interferents. Clinical correlation is recommended. Repeat testing may be considered.    Bilirubin, Total 01/11/2025 0.8  0.0 - 1.2 mg/dL Final    ALT 01/11/2025 28  7 - 45 U/L Final    Patients treated with Sulfasalazine may generate falsely decreased results for ALT.    WBC 01/11/2025 16.0 (H)  4.4 - 11.3 x10*3/uL Final    nRBC 01/11/2025 0.0  0.0 - 0.0 /100 WBCs Final    RBC 01/11/2025 4.76  4.00 - 5.20 x10*6/uL Final    Hemoglobin 01/11/2025 14.6  12.0 - 16.0 g/dL Final    Hematocrit 01/11/2025 43.8  36.0 - 46.0 % Final    MCV 01/11/2025 92  80 - 100 fL Final    MCH 01/11/2025 30.7  26.0 - 34.0 pg Final    MCHC 01/11/2025 33.3  32.0 - 36.0 g/dL Final    RDW 01/11/2025 16.2 (H)  11.5 - 14.5 % Final    Platelets 01/11/2025 202  150 - 450 x10*3/uL Final     Neutrophils % 01/11/2025 88.9  40.0 - 80.0 % Final    Immature Granulocytes %, Automated 01/11/2025 0.9  0.0 - 0.9 % Final    Immature Granulocyte Count (IG) includes promyelocytes, myelocytes and metamyelocytes but does not include bands. Percent differential counts (%) should be interpreted in the context of the absolute cell counts (cells/UL).    Lymphocytes % 01/11/2025 4.0  13.0 - 44.0 % Final    Monocytes % 01/11/2025 5.3  2.0 - 10.0 % Final    Eosinophils % 01/11/2025 0.6  0.0 - 6.0 % Final    Basophils % 01/11/2025 0.3  0.0 - 2.0 % Final    Neutrophils Absolute 01/11/2025 14.19 (H)  1.20 - 7.70 x10*3/uL Final    Percent differential counts (%) should be interpreted in the context of the absolute cell counts (cells/uL).    Immature Granulocytes Absolute, Au* 01/11/2025 0.15  0.00 - 0.70 x10*3/uL Final    Lymphocytes Absolute 01/11/2025 0.64 (L)  1.20 - 4.80 x10*3/uL Final    Monocytes Absolute 01/11/2025 0.84  0.10 - 1.00 x10*3/uL Final    Eosinophils Absolute 01/11/2025 0.10  0.00 - 0.70 x10*3/uL Final    Basophils Absolute 01/11/2025 0.05  0.00 - 0.10 x10*3/uL Final    Color, Urine 01/11/2025 Dark-Yellow  Light-Yellow, Yellow, Dark-Yellow Final    Appearance, Urine 01/11/2025 Turbid (N)  Clear Final    Specific Gravity, Urine 01/11/2025 1.026  1.005 - 1.035 Final    pH, Urine 01/11/2025 5.5  5.0, 5.5, 6.0, 6.5, 7.0, 7.5, 8.0 Final    Protein, Urine 01/11/2025 30 (1+) (A)  NEGATIVE, 10 (TRACE), 20 (TRACE) mg/dL Final    Glucose, Urine 01/11/2025 Normal  Normal mg/dL Final    Blood, Urine 01/11/2025 0.03 (TRACE) (A)  NEGATIVE Final    Ketones, Urine 01/11/2025 NEGATIVE  NEGATIVE mg/dL Final    Bilirubin, Urine 01/11/2025 0.5 (1+) (A)  NEGATIVE Final    Urobilinogen, Urine 01/11/2025 8 (3+) (A)  Normal mg/dL Final    Some pigments and medications may cause a false positive urobilinogen.    Nitrite, Urine 01/11/2025 NEGATIVE  NEGATIVE Final    Leukocyte Esterase, Urine 01/11/2025 500 Shyann/uL (A)  NEGATIVE Final     Extra Tube 01/11/2025 Hold for add-ons.   Final    Auto resulted.    WBC, Urine 01/11/2025 >50 (A)  1-5, NONE /HPF Final    WBC Clumps, Urine 01/11/2025 MODERATE  Reference range not established. /HPF Final    RBC, Urine 01/11/2025 >20 (A)  NONE, 1-2, 3-5 /HPF Final    Squamous Epithelial Cells, Urine 01/11/2025 1-9 (SPARSE)  Reference range not established. /HPF Final    Bacteria, Urine 01/11/2025 1+ (A)  NONE SEEN /HPF Final    Mucus, Urine 01/11/2025 1+  Reference range not established. /LPF Final    Hyaline Casts, Urine 01/11/2025 4+ (A)  NONE /LPF Final    Urine Culture 01/11/2025 20,000 - 80,000 CFU/mL Escherichia coli (A)   Final       No further follow up needed from EDPD Team.     If there are any other questions for the ED Post-Discharge Culture Follow Up Team, please contact [phone]. Fax: [phone].    Victor M Moise, MichellD

## 2025-01-21 ENCOUNTER — APPOINTMENT (OUTPATIENT)
Dept: RADIOLOGY | Facility: HOSPITAL | Age: 60
DRG: 638 | End: 2025-01-21
Payer: COMMERCIAL

## 2025-01-21 ENCOUNTER — HOSPITAL ENCOUNTER (INPATIENT)
Facility: HOSPITAL | Age: 60
DRG: 638 | End: 2025-01-21
Attending: STUDENT IN AN ORGANIZED HEALTH CARE EDUCATION/TRAINING PROGRAM | Admitting: STUDENT IN AN ORGANIZED HEALTH CARE EDUCATION/TRAINING PROGRAM
Payer: COMMERCIAL

## 2025-01-21 DIAGNOSIS — L03.115 CELLULITIS OF RIGHT LOWER EXTREMITY: Primary | ICD-10-CM

## 2025-01-21 PROBLEM — E03.9 HYPOTHYROIDISM, ACQUIRED: Status: ACTIVE | Noted: 2021-07-12

## 2025-01-21 PROBLEM — M17.11 PRIMARY LOCALIZED OSTEOARTHRITIS OF RIGHT KNEE: Status: ACTIVE | Noted: 2023-03-29

## 2025-01-21 PROBLEM — M17.10 ARTHRITIS OF KNEE: Status: ACTIVE | Noted: 2023-03-06

## 2025-01-21 PROBLEM — C54.1 ENDOMETRIAL CANCER (MULTI): Status: ACTIVE | Noted: 2020-07-09

## 2025-01-21 PROBLEM — F33.1 MODERATE EPISODE OF RECURRENT MAJOR DEPRESSIVE DISORDER: Status: ACTIVE | Noted: 2021-08-17

## 2025-01-21 PROBLEM — I10 HYPERTENSION, ESSENTIAL: Status: ACTIVE | Noted: 2018-03-30

## 2025-01-21 LAB
ALBUMIN SERPL BCP-MCNC: 3.3 G/DL (ref 3.4–5)
ALP SERPL-CCNC: 105 U/L (ref 33–136)
ALT SERPL W P-5'-P-CCNC: 20 U/L (ref 7–45)
ANION GAP SERPL CALC-SCNC: 9 MMOL/L (ref 10–20)
AST SERPL W P-5'-P-CCNC: 29 U/L (ref 9–39)
BASOPHILS # BLD AUTO: 0.05 X10*3/UL (ref 0–0.1)
BASOPHILS NFR BLD AUTO: 0.6 %
BILIRUB SERPL-MCNC: 0.5 MG/DL (ref 0–1.2)
BUN SERPL-MCNC: 10 MG/DL (ref 6–23)
CALCIUM SERPL-MCNC: 8.5 MG/DL (ref 8.6–10.3)
CHLORIDE SERPL-SCNC: 103 MMOL/L (ref 98–107)
CO2 SERPL-SCNC: 27 MMOL/L (ref 21–32)
CREAT SERPL-MCNC: 0.84 MG/DL (ref 0.5–1.05)
CRP SERPL-MCNC: 13.49 MG/DL
EGFRCR SERPLBLD CKD-EPI 2021: 79 ML/MIN/1.73M*2
EOSINOPHIL # BLD AUTO: 0.23 X10*3/UL (ref 0–0.7)
EOSINOPHIL NFR BLD AUTO: 2.8 %
ERYTHROCYTE [DISTWIDTH] IN BLOOD BY AUTOMATED COUNT: 16.1 % (ref 11.5–14.5)
ERYTHROCYTE [SEDIMENTATION RATE] IN BLOOD BY WESTERGREN METHOD: 108 MM/H (ref 0–30)
GLUCOSE BLD MANUAL STRIP-MCNC: 135 MG/DL (ref 74–99)
GLUCOSE BLD MANUAL STRIP-MCNC: 80 MG/DL (ref 74–99)
GLUCOSE SERPL-MCNC: 98 MG/DL (ref 74–99)
HCT VFR BLD AUTO: 40.8 % (ref 36–46)
HGB BLD-MCNC: 13.2 G/DL (ref 12–16)
IMM GRANULOCYTES # BLD AUTO: 0.07 X10*3/UL (ref 0–0.7)
IMM GRANULOCYTES NFR BLD AUTO: 0.9 % (ref 0–0.9)
LACTATE SERPL-SCNC: 1.1 MMOL/L (ref 0.4–2)
LYMPHOCYTES # BLD AUTO: 0.88 X10*3/UL (ref 1.2–4.8)
LYMPHOCYTES NFR BLD AUTO: 10.7 %
MCH RBC QN AUTO: 29.9 PG (ref 26–34)
MCHC RBC AUTO-ENTMCNC: 32.4 G/DL (ref 32–36)
MCV RBC AUTO: 92 FL (ref 80–100)
MONOCYTES # BLD AUTO: 0.38 X10*3/UL (ref 0.1–1)
MONOCYTES NFR BLD AUTO: 4.6 %
NEUTROPHILS # BLD AUTO: 6.6 X10*3/UL (ref 1.2–7.7)
NEUTROPHILS NFR BLD AUTO: 80.4 %
NRBC BLD-RTO: 0 /100 WBCS (ref 0–0)
PLATELET # BLD AUTO: 441 X10*3/UL (ref 150–450)
POTASSIUM SERPL-SCNC: 3.8 MMOL/L (ref 3.5–5.3)
PROT SERPL-MCNC: 6.8 G/DL (ref 6.4–8.2)
RBC # BLD AUTO: 4.42 X10*6/UL (ref 4–5.2)
RBC MORPH BLD: NORMAL
SODIUM SERPL-SCNC: 135 MMOL/L (ref 136–145)
WBC # BLD AUTO: 8.2 X10*3/UL (ref 4.4–11.3)

## 2025-01-21 PROCEDURE — 85652 RBC SED RATE AUTOMATED: CPT | Performed by: STUDENT IN AN ORGANIZED HEALTH CARE EDUCATION/TRAINING PROGRAM

## 2025-01-21 PROCEDURE — 82947 ASSAY GLUCOSE BLOOD QUANT: CPT

## 2025-01-21 PROCEDURE — 1100000001 HC PRIVATE ROOM DAILY

## 2025-01-21 PROCEDURE — 96375 TX/PRO/DX INJ NEW DRUG ADDON: CPT

## 2025-01-21 PROCEDURE — 36415 COLL VENOUS BLD VENIPUNCTURE: CPT | Performed by: NURSE PRACTITIONER

## 2025-01-21 PROCEDURE — 87040 BLOOD CULTURE FOR BACTERIA: CPT | Mod: PORLAB | Performed by: NURSE PRACTITIONER

## 2025-01-21 PROCEDURE — 82565 ASSAY OF CREATININE: CPT | Performed by: NURSE PRACTITIONER

## 2025-01-21 PROCEDURE — 2500000004 HC RX 250 GENERAL PHARMACY W/ HCPCS (ALT 636 FOR OP/ED): Performed by: STUDENT IN AN ORGANIZED HEALTH CARE EDUCATION/TRAINING PROGRAM

## 2025-01-21 PROCEDURE — 80053 COMPREHEN METABOLIC PANEL: CPT | Performed by: NURSE PRACTITIONER

## 2025-01-21 PROCEDURE — 2500000004 HC RX 250 GENERAL PHARMACY W/ HCPCS (ALT 636 FOR OP/ED): Performed by: NURSE PRACTITIONER

## 2025-01-21 PROCEDURE — 73700 CT LOWER EXTREMITY W/O DYE: CPT | Mod: RT

## 2025-01-21 PROCEDURE — 83605 ASSAY OF LACTIC ACID: CPT | Performed by: NURSE PRACTITIONER

## 2025-01-21 PROCEDURE — 86140 C-REACTIVE PROTEIN: CPT | Performed by: STUDENT IN AN ORGANIZED HEALTH CARE EDUCATION/TRAINING PROGRAM

## 2025-01-21 PROCEDURE — 73700 CT LOWER EXTREMITY W/O DYE: CPT | Mod: RIGHT SIDE | Performed by: RADIOLOGY

## 2025-01-21 PROCEDURE — 99223 1ST HOSP IP/OBS HIGH 75: CPT | Performed by: STUDENT IN AN ORGANIZED HEALTH CARE EDUCATION/TRAINING PROGRAM

## 2025-01-21 PROCEDURE — 85025 COMPLETE CBC W/AUTO DIFF WBC: CPT | Performed by: NURSE PRACTITIONER

## 2025-01-21 PROCEDURE — 2500000001 HC RX 250 WO HCPCS SELF ADMINISTERED DRUGS (ALT 637 FOR MEDICARE OP): Performed by: STUDENT IN AN ORGANIZED HEALTH CARE EDUCATION/TRAINING PROGRAM

## 2025-01-21 PROCEDURE — 99285 EMERGENCY DEPT VISIT HI MDM: CPT | Performed by: STUDENT IN AN ORGANIZED HEALTH CARE EDUCATION/TRAINING PROGRAM

## 2025-01-21 PROCEDURE — 96365 THER/PROPH/DIAG IV INF INIT: CPT

## 2025-01-21 RX ORDER — ENOXAPARIN SODIUM 100 MG/ML
60 INJECTION SUBCUTANEOUS EVERY 12 HOURS SCHEDULED
Status: DISCONTINUED | OUTPATIENT
Start: 2025-01-21 | End: 2025-01-28 | Stop reason: HOSPADM

## 2025-01-21 RX ORDER — CEFEPIME 1 G/50ML
2 INJECTION, SOLUTION INTRAVENOUS EVERY 8 HOURS
Status: DISCONTINUED | OUTPATIENT
Start: 2025-01-21 | End: 2025-01-23

## 2025-01-21 RX ORDER — ONDANSETRON 4 MG/1
4 TABLET, FILM COATED ORAL EVERY 8 HOURS PRN
Status: DISCONTINUED | OUTPATIENT
Start: 2025-01-21 | End: 2025-01-28 | Stop reason: HOSPADM

## 2025-01-21 RX ORDER — INSULIN LISPRO 100 [IU]/ML
0-10 INJECTION, SOLUTION INTRAVENOUS; SUBCUTANEOUS
Status: DISCONTINUED | OUTPATIENT
Start: 2025-01-21 | End: 2025-01-28 | Stop reason: HOSPADM

## 2025-01-21 RX ORDER — BISACODYL 5 MG
10 TABLET, DELAYED RELEASE (ENTERIC COATED) ORAL DAILY PRN
Status: DISCONTINUED | OUTPATIENT
Start: 2025-01-21 | End: 2025-01-28 | Stop reason: HOSPADM

## 2025-01-21 RX ORDER — VANCOMYCIN HYDROCHLORIDE 1 G/20ML
INJECTION, POWDER, LYOPHILIZED, FOR SOLUTION INTRAVENOUS DAILY PRN
Status: DISCONTINUED | OUTPATIENT
Start: 2025-01-21 | End: 2025-01-23

## 2025-01-21 RX ORDER — BISACODYL 10 MG/1
10 SUPPOSITORY RECTAL DAILY PRN
Status: DISCONTINUED | OUTPATIENT
Start: 2025-01-21 | End: 2025-01-28 | Stop reason: HOSPADM

## 2025-01-21 RX ORDER — DEXTROSE 50 % IN WATER (D50W) INTRAVENOUS SYRINGE
25
Status: DISCONTINUED | OUTPATIENT
Start: 2025-01-21 | End: 2025-01-28 | Stop reason: HOSPADM

## 2025-01-21 RX ORDER — DEXTROSE 50 % IN WATER (D50W) INTRAVENOUS SYRINGE
12.5
Status: DISCONTINUED | OUTPATIENT
Start: 2025-01-21 | End: 2025-01-28 | Stop reason: HOSPADM

## 2025-01-21 RX ORDER — FOLIC ACID 1 MG/1
1 TABLET ORAL
Status: DISCONTINUED | OUTPATIENT
Start: 2025-01-22 | End: 2025-01-28 | Stop reason: HOSPADM

## 2025-01-21 RX ORDER — OXYCODONE AND ACETAMINOPHEN 5; 325 MG/1; MG/1
1 TABLET ORAL EVERY 6 HOURS PRN
Status: DISCONTINUED | OUTPATIENT
Start: 2025-01-21 | End: 2025-01-28 | Stop reason: HOSPADM

## 2025-01-21 RX ORDER — CEFAZOLIN SODIUM 2 G/100ML
2 INJECTION, SOLUTION INTRAVENOUS ONCE
Status: COMPLETED | OUTPATIENT
Start: 2025-01-21 | End: 2025-01-21

## 2025-01-21 RX ORDER — ACETAMINOPHEN 325 MG/1
650 TABLET ORAL EVERY 4 HOURS PRN
Status: DISCONTINUED | OUTPATIENT
Start: 2025-01-21 | End: 2025-01-28 | Stop reason: HOSPADM

## 2025-01-21 RX ORDER — FUROSEMIDE 20 MG/1
20 TABLET ORAL DAILY
Status: DISCONTINUED | OUTPATIENT
Start: 2025-01-22 | End: 2025-01-28 | Stop reason: HOSPADM

## 2025-01-21 RX ORDER — LEVOTHYROXINE SODIUM 125 UG/1
125 TABLET ORAL DAILY
Status: DISCONTINUED | OUTPATIENT
Start: 2025-01-22 | End: 2025-01-28 | Stop reason: HOSPADM

## 2025-01-21 RX ORDER — GUAIFENESIN 600 MG/1
600 TABLET, EXTENDED RELEASE ORAL EVERY 12 HOURS PRN
Status: DISCONTINUED | OUTPATIENT
Start: 2025-01-21 | End: 2025-01-28 | Stop reason: HOSPADM

## 2025-01-21 RX ORDER — METOPROLOL SUCCINATE 50 MG/1
50 TABLET, EXTENDED RELEASE ORAL DAILY
Status: DISCONTINUED | OUTPATIENT
Start: 2025-01-22 | End: 2025-01-28 | Stop reason: HOSPADM

## 2025-01-21 RX ORDER — METRONIDAZOLE 500 MG/100ML
500 INJECTION, SOLUTION INTRAVENOUS EVERY 8 HOURS
Status: DISCONTINUED | OUTPATIENT
Start: 2025-01-21 | End: 2025-01-23

## 2025-01-21 RX ORDER — PANTOPRAZOLE SODIUM 40 MG/10ML
40 INJECTION, POWDER, LYOPHILIZED, FOR SOLUTION INTRAVENOUS
Status: DISCONTINUED | OUTPATIENT
Start: 2025-01-22 | End: 2025-01-28 | Stop reason: HOSPADM

## 2025-01-21 RX ORDER — VANCOMYCIN HYDROCHLORIDE 1 G/20ML
INJECTION, POWDER, LYOPHILIZED, FOR SOLUTION INTRAVENOUS DAILY PRN
Status: DISCONTINUED | OUTPATIENT
Start: 2025-01-21 | End: 2025-01-21

## 2025-01-21 RX ORDER — POLYETHYLENE GLYCOL 3350 17 G/17G
17 POWDER, FOR SOLUTION ORAL DAILY
Status: DISCONTINUED | OUTPATIENT
Start: 2025-01-21 | End: 2025-01-28 | Stop reason: HOSPADM

## 2025-01-21 RX ORDER — ONDANSETRON HYDROCHLORIDE 2 MG/ML
4 INJECTION, SOLUTION INTRAVENOUS EVERY 8 HOURS PRN
Status: DISCONTINUED | OUTPATIENT
Start: 2025-01-21 | End: 2025-01-28 | Stop reason: HOSPADM

## 2025-01-21 RX ORDER — PANTOPRAZOLE SODIUM 40 MG/1
40 TABLET, DELAYED RELEASE ORAL
Status: DISCONTINUED | OUTPATIENT
Start: 2025-01-22 | End: 2025-01-28 | Stop reason: HOSPADM

## 2025-01-21 RX ORDER — ESCITALOPRAM OXALATE 10 MG/1
20 TABLET ORAL DAILY
Status: DISCONTINUED | OUTPATIENT
Start: 2025-01-21 | End: 2025-01-28 | Stop reason: HOSPADM

## 2025-01-21 RX ORDER — TALC
3 POWDER (GRAM) TOPICAL NIGHTLY PRN
Status: DISCONTINUED | OUTPATIENT
Start: 2025-01-21 | End: 2025-01-28 | Stop reason: HOSPADM

## 2025-01-21 RX ORDER — OXYCODONE HYDROCHLORIDE 10 MG/1
10 TABLET ORAL EVERY 6 HOURS PRN
Status: DISCONTINUED | OUTPATIENT
Start: 2025-01-21 | End: 2025-01-28 | Stop reason: HOSPADM

## 2025-01-21 RX ADMIN — ENOXAPARIN SODIUM 60 MG: 60 INJECTION SUBCUTANEOUS at 17:56

## 2025-01-21 RX ADMIN — VANCOMYCIN HYDROCHLORIDE 1500 MG: 1.5 INJECTION, POWDER, LYOPHILIZED, FOR SOLUTION INTRAVENOUS at 23:54

## 2025-01-21 RX ADMIN — OXYCODONE HYDROCHLORIDE 10 MG: 10 TABLET ORAL at 17:56

## 2025-01-21 RX ADMIN — VANCOMYCIN HYDROCHLORIDE 2000 MG: 10 INJECTION, POWDER, LYOPHILIZED, FOR SOLUTION INTRAVENOUS at 11:58

## 2025-01-21 RX ADMIN — METRONIDAZOLE 500 MG: 500 INJECTION, SOLUTION INTRAVENOUS at 17:56

## 2025-01-21 RX ADMIN — CEFEPIME 2 G: 1 INJECTION, SOLUTION INTRAVENOUS at 17:56

## 2025-01-21 RX ADMIN — CEFAZOLIN SODIUM 2 G: 2 INJECTION, SOLUTION INTRAVENOUS at 11:17

## 2025-01-21 SDOH — ECONOMIC STABILITY: HOUSING INSECURITY: IN THE LAST 12 MONTHS, WAS THERE A TIME WHEN YOU WERE NOT ABLE TO PAY THE MORTGAGE OR RENT ON TIME?: NO

## 2025-01-21 SDOH — ECONOMIC STABILITY: HOUSING INSECURITY: IN THE PAST 12 MONTHS, HOW MANY TIMES HAVE YOU MOVED WHERE YOU WERE LIVING?: 0

## 2025-01-21 SDOH — ECONOMIC STABILITY: FOOD INSECURITY: WITHIN THE PAST 12 MONTHS, YOU WORRIED THAT YOUR FOOD WOULD RUN OUT BEFORE YOU GOT THE MONEY TO BUY MORE.: NEVER TRUE

## 2025-01-21 SDOH — ECONOMIC STABILITY: HOUSING INSECURITY: AT ANY TIME IN THE PAST 12 MONTHS, WERE YOU HOMELESS OR LIVING IN A SHELTER (INCLUDING NOW)?: NO

## 2025-01-21 SDOH — SOCIAL STABILITY: SOCIAL INSECURITY: ABUSE: ADULT

## 2025-01-21 SDOH — ECONOMIC STABILITY: FOOD INSECURITY: HOW HARD IS IT FOR YOU TO PAY FOR THE VERY BASICS LIKE FOOD, HOUSING, MEDICAL CARE, AND HEATING?: NOT HARD AT ALL

## 2025-01-21 SDOH — ECONOMIC STABILITY: TRANSPORTATION INSECURITY: IN THE PAST 12 MONTHS, HAS LACK OF TRANSPORTATION KEPT YOU FROM MEDICAL APPOINTMENTS OR FROM GETTING MEDICATIONS?: NO

## 2025-01-21 SDOH — ECONOMIC STABILITY: INCOME INSECURITY: IN THE PAST 12 MONTHS HAS THE ELECTRIC, GAS, OIL, OR WATER COMPANY THREATENED TO SHUT OFF SERVICES IN YOUR HOME?: NO

## 2025-01-21 SDOH — SOCIAL STABILITY: SOCIAL INSECURITY: WITHIN THE LAST YEAR, HAVE YOU BEEN HUMILIATED OR EMOTIONALLY ABUSED IN OTHER WAYS BY YOUR PARTNER OR EX-PARTNER?: NO

## 2025-01-21 SDOH — SOCIAL STABILITY: SOCIAL INSECURITY: WITHIN THE LAST YEAR, HAVE YOU BEEN AFRAID OF YOUR PARTNER OR EX-PARTNER?: NO

## 2025-01-21 SDOH — SOCIAL STABILITY: SOCIAL INSECURITY
WITHIN THE LAST YEAR, HAVE YOU BEEN RAPED OR FORCED TO HAVE ANY KIND OF SEXUAL ACTIVITY BY YOUR PARTNER OR EX-PARTNER?: NO

## 2025-01-21 SDOH — ECONOMIC STABILITY: FOOD INSECURITY: WITHIN THE PAST 12 MONTHS, THE FOOD YOU BOUGHT JUST DIDN'T LAST AND YOU DIDN'T HAVE MONEY TO GET MORE.: NEVER TRUE

## 2025-01-21 SDOH — SOCIAL STABILITY: SOCIAL INSECURITY: WERE YOU ABLE TO COMPLETE ALL THE BEHAVIORAL HEALTH SCREENINGS?: YES

## 2025-01-21 SDOH — SOCIAL STABILITY: SOCIAL INSECURITY: HAVE YOU HAD THOUGHTS OF HARMING ANYONE ELSE?: NO

## 2025-01-21 SDOH — SOCIAL STABILITY: SOCIAL INSECURITY
WITHIN THE LAST YEAR, HAVE YOU BEEN KICKED, HIT, SLAPPED, OR OTHERWISE PHYSICALLY HURT BY YOUR PARTNER OR EX-PARTNER?: NO

## 2025-01-21 ASSESSMENT — COGNITIVE AND FUNCTIONAL STATUS - GENERAL
PATIENT BASELINE BEDBOUND: NO
DAILY ACTIVITIY SCORE: 24
MOBILITY SCORE: 24
DAILY ACTIVITIY SCORE: 24
DAILY ACTIVITIY SCORE: 24
MOBILITY SCORE: 24
MOBILITY SCORE: 24

## 2025-01-21 ASSESSMENT — PAIN DESCRIPTION - ORIENTATION: ORIENTATION: RIGHT

## 2025-01-21 ASSESSMENT — LIFESTYLE VARIABLES
AUDIT-C TOTAL SCORE: 0
HOW OFTEN DO YOU HAVE A DRINK CONTAINING ALCOHOL: NEVER
SKIP TO QUESTIONS 9-10: 1
HOW MANY STANDARD DRINKS CONTAINING ALCOHOL DO YOU HAVE ON A TYPICAL DAY: PATIENT DOES NOT DRINK
HOW OFTEN DO YOU HAVE 6 OR MORE DRINKS ON ONE OCCASION: NEVER
AUDIT-C TOTAL SCORE: 0

## 2025-01-21 ASSESSMENT — COLUMBIA-SUICIDE SEVERITY RATING SCALE - C-SSRS
1. IN THE PAST MONTH, HAVE YOU WISHED YOU WERE DEAD OR WISHED YOU COULD GO TO SLEEP AND NOT WAKE UP?: NO
6. HAVE YOU EVER DONE ANYTHING, STARTED TO DO ANYTHING, OR PREPARED TO DO ANYTHING TO END YOUR LIFE?: NO
2. HAVE YOU ACTUALLY HAD ANY THOUGHTS OF KILLING YOURSELF?: NO

## 2025-01-21 ASSESSMENT — ENCOUNTER SYMPTOMS
SLEEP DISTURBANCE: 0
FATIGUE: 1
DIZZINESS: 0
CHILLS: 1
TREMORS: 0
APPETITE CHANGE: 1
NAUSEA: 0
DYSURIA: 0
WEAKNESS: 0
FEVER: 0
HEMATURIA: 0
BLOOD IN STOOL: 0
COUGH: 0
POLYDIPSIA: 0
PALPITATIONS: 0
ABDOMINAL PAIN: 0
SHORTNESS OF BREATH: 0
COLOR CHANGE: 1
WOUND: 1
CONFUSION: 0
VOMITING: 0
PHOTOPHOBIA: 0
LIGHT-HEADEDNESS: 0

## 2025-01-21 ASSESSMENT — ACTIVITIES OF DAILY LIVING (ADL)
HEARING - LEFT EAR: FUNCTIONAL
DRESSING YOURSELF: INDEPENDENT
FEEDING YOURSELF: INDEPENDENT
TOILETING: INDEPENDENT
BATHING: INDEPENDENT
JUDGMENT_ADEQUATE_SAFELY_COMPLETE_DAILY_ACTIVITIES: YES
GROOMING: INDEPENDENT
LACK_OF_TRANSPORTATION: NO
PATIENT'S MEMORY ADEQUATE TO SAFELY COMPLETE DAILY ACTIVITIES?: YES
WALKS IN HOME: INDEPENDENT
ADEQUATE_TO_COMPLETE_ADL: YES
HEARING - RIGHT EAR: FUNCTIONAL

## 2025-01-21 ASSESSMENT — PAIN SCALES - GENERAL
PAINLEVEL_OUTOF10: 0 - NO PAIN
PAINLEVEL_OUTOF10: 8
PAINLEVEL_OUTOF10: 6
PAINLEVEL_OUTOF10: 0 - NO PAIN

## 2025-01-21 ASSESSMENT — PAIN - FUNCTIONAL ASSESSMENT
PAIN_FUNCTIONAL_ASSESSMENT: 0-10

## 2025-01-21 ASSESSMENT — PATIENT HEALTH QUESTIONNAIRE - PHQ9
2. FEELING DOWN, DEPRESSED OR HOPELESS: NOT AT ALL
1. LITTLE INTEREST OR PLEASURE IN DOING THINGS: NOT AT ALL
SUM OF ALL RESPONSES TO PHQ9 QUESTIONS 1 & 2: 0

## 2025-01-21 ASSESSMENT — PAIN DESCRIPTION - LOCATION: LOCATION: LEG

## 2025-01-21 NOTE — CARE PLAN
The patient's goals for the shift include    Problem: Safety - Adult  Goal: Free from fall injury  Outcome: Progressing     Problem: Discharge Planning  Goal: Discharge to home or other facility with appropriate resources  Outcome: Progressing     Problem: Chronic Conditions and Co-morbidities  Goal: Patient's chronic conditions and co-morbidity symptoms are monitored and maintained or improved  Outcome: Progressing     Problem: Skin  Goal: Decreased wound size/increased tissue granulation at next dressing change  Outcome: Progressing  Flowsheets (Taken 1/21/2025 1814)  Decreased wound size/increased tissue granulation at next dressing change: Promote sleep for wound healing  Goal: Participates in plan/prevention/treatment measures  Outcome: Progressing  Flowsheets (Taken 1/21/2025 1814)  Participates in plan/prevention/treatment measures: Increase activity/out of bed for meals  Goal: Prevent/manage excess moisture  Outcome: Progressing  Flowsheets (Taken 1/21/2025 1814)  Prevent/manage excess moisture: Monitor for/manage infection if present  Goal: Prevent/minimize sheer/friction injuries  Outcome: Progressing  Flowsheets (Taken 1/21/2025 1814)  Prevent/minimize sheer/friction injuries: Turn/reposition every 2 hours/use positioning/transfer devices  Goal: Promote/optimize nutrition  Outcome: Progressing  Flowsheets (Taken 1/21/2025 1814)  Promote/optimize nutrition:   Monitor/record intake including meals   Consume > 50% meals/supplements   Offer water/supplements/favorite foods  Goal: Promote skin healing  Outcome: Progressing     Problem: Pain  Goal: Takes deep breaths with improved pain control throughout the shift  Outcome: Progressing  Goal: Turns in bed with improved pain control throughout the shift  Outcome: Progressing  Goal: Walks with improved pain control throughout the shift  Outcome: Progressing  Goal: Performs ADL's with improved pain control throughout shift  Outcome: Progressing  Goal: Participates  in PT with improved pain control throughout the shift  Outcome: Progressing  Goal: Free from opioid side effects throughout the shift  Outcome: Progressing  Goal: Free from acute confusion related to pain meds throughout the shift  Outcome: Progressing       The clinical goals for the shift include no falls    See assessment and mar. Remains on room air. Morning  labs  ordered. See blood sugars. New admit this shift.

## 2025-01-21 NOTE — ED TRIAGE NOTES
Pt to ED with c/o RLE swelling/cellulitis. Pt seen 1 week ago and sent home on antibiotics. Finishes rx tomorrow. C/o worsening pain and infection. Denies fevers/chills

## 2025-01-21 NOTE — H&P
[facility] - GENERAL MEDICINE HISTORY AND PHYSICAL    History Obtained From (Primary Source): Patient  Collateral History (Secondary Sources): D/w ED    History Of Present Illness (HPI):  Mercedez Ellington is a 60 y.o. female with PMHx s/f HTN, DLD, T2DM (diet-controlled, last A1c 6.5%), hypothyroidism, psoriasis / psoriatic arthritis (on methotrexate), rosacea, endometrial cancer s/p hysterectomy, obesity, presenting with worsening RLE swelling, discoloration, and skin sloughing. Pt was initially seen in the ED 01/15/25 for RLE swelling and groin pain, was diagnosed with UTI and RLE cellulitis (had a negative DVT US). She was discharged on Keflex and Bactrim. Since discharge, she had been doing fairly well and was experiencing improvement in the burning pain she had initially had in the RLE since prior to start of abx. She had also been wrapping her RLE and applying a topical 3-antibiotic ointment. She reports that she did miss two days of changing the dressing and when her daughter came to assist with the dressing change, it was noted that she had worsening of the redness and more blistering / skin peeling than she had originally had. It was also more swollen compared to prior. She reports no fevers since the original day she came to the ED (Tmax 101.7 at home on 01/10/25). She has chronic chills but that is unchanged. She reports resolution of UTI symptoms. She has a cold sore on the lower portion of the R side of her mouth but denies any skin changes (outside of chronic psoriatic patches) anywhere else on her body or mouth. Her appetite has been present but slightly diminished from baseline. Denies cp/pressure, palpitations, diaphoresis, SOB, HURST, dizziness / lightheadedness, syncope or near syncope, HA, vision changes, f/n/v/d/abd pain. She denies hx VTE. She has been able to place weight on the RLE and denies issues with ROM of the knee / ankle. Notably does have history of bilateral knee  replacements 2/2 OA.     ED Course (Summary - please note all labs, imaging studies, and interventions noted below have been personally reviewed and/or interpreted on day of admission):   Vitals on presentation: T96.3, /64, HR 71, RR 18, SpO2 96% RA  Labs: CBC with WBC 8.2, Hgb 13.2, platelets 441.  CMP with glucose 98, sodium 135, potassium 3.8, BUN 10, serum creatinine 0.84.  Lactate 1.1.  I ordered add on CRP (13.49), ESR (108).  Blood cultures x 2 were collected in the ED and are in process.  Imaging: none completed day of admission   Neg DVT US from 01/11/25   Interventions: Vancomycin/Ancef 2 g, admitted to medicine    12-point ROS reviewed and found to be negative aside from aforementioned positives in HPI and/or noted in dedicated ROS section below.     ED Course (From ED Provider):  Diagnoses as of 01/21/25 1437   Cellulitis of right lower extremity     Relevant Results  Results for orders placed or performed during the hospital encounter of 01/21/25 (from the past 24 hours)   CBC and Auto Differential   Result Value Ref Range    WBC 8.2 4.4 - 11.3 x10*3/uL    nRBC 0.0 0.0 - 0.0 /100 WBCs    RBC 4.42 4.00 - 5.20 x10*6/uL    Hemoglobin 13.2 12.0 - 16.0 g/dL    Hematocrit 40.8 36.0 - 46.0 %    MCV 92 80 - 100 fL    MCH 29.9 26.0 - 34.0 pg    MCHC 32.4 32.0 - 36.0 g/dL    RDW 16.1 (H) 11.5 - 14.5 %    Platelets 441 150 - 450 x10*3/uL    Neutrophils % 80.4 40.0 - 80.0 %    Immature Granulocytes %, Automated 0.9 0.0 - 0.9 %    Lymphocytes % 10.7 13.0 - 44.0 %    Monocytes % 4.6 2.0 - 10.0 %    Eosinophils % 2.8 0.0 - 6.0 %    Basophils % 0.6 0.0 - 2.0 %    Neutrophils Absolute 6.60 1.20 - 7.70 x10*3/uL    Immature Granulocytes Absolute, Automated 0.07 0.00 - 0.70 x10*3/uL    Lymphocytes Absolute 0.88 (L) 1.20 - 4.80 x10*3/uL    Monocytes Absolute 0.38 0.10 - 1.00 x10*3/uL    Eosinophils Absolute 0.23 0.00 - 0.70 x10*3/uL    Basophils Absolute 0.05 0.00 - 0.10 x10*3/uL   Comprehensive metabolic panel    Result Value Ref Range    Glucose 98 74 - 99 mg/dL    Sodium 135 (L) 136 - 145 mmol/L    Potassium 3.8 3.5 - 5.3 mmol/L    Chloride 103 98 - 107 mmol/L    Bicarbonate 27 21 - 32 mmol/L    Anion Gap 9 (L) 10 - 20 mmol/L    Urea Nitrogen 10 6 - 23 mg/dL    Creatinine 0.84 0.50 - 1.05 mg/dL    eGFR 79 >60 mL/min/1.73m*2    Calcium 8.5 (L) 8.6 - 10.3 mg/dL    Albumin 3.3 (L) 3.4 - 5.0 g/dL    Alkaline Phosphatase 105 33 - 136 U/L    Total Protein 6.8 6.4 - 8.2 g/dL    AST 29 9 - 39 U/L    Bilirubin, Total 0.5 0.0 - 1.2 mg/dL    ALT 20 7 - 45 U/L   Lactate   Result Value Ref Range    Lactate 1.1 0.4 - 2.0 mmol/L   Morphology   Result Value Ref Range    RBC Morphology No significant RBC morphology present    Sedimentation rate, automated   Result Value Ref Range    Sedimentation Rate 108 (H) 0 - 30 mm/h   C-reactive protein   Result Value Ref Range    C-Reactive Protein 13.49 (H) <1.00 mg/dL      No results found.   Scheduled medications:  cefepime, 2 g, intravenous, q8h  enoxaparin, 60 mg, subcutaneous, q12h SUSHILA  escitalopram, 20 mg, oral, Daily  [START ON 1/22/2025] folic acid, 1 mg, oral, Daily  [START ON 1/22/2025] furosemide, 20 mg, oral, Daily  insulin lispro, 0-10 Units, subcutaneous, Before meals & nightly  [START ON 1/22/2025] levothyroxine, 125 mcg, oral, Daily  [START ON 1/22/2025] metoprolol succinate XL, 50 mg, oral, Daily  metroNIDAZOLE, 500 mg, intravenous, q8h  [START ON 1/22/2025] pantoprazole, 40 mg, oral, Daily before breakfast   Or  [START ON 1/22/2025] pantoprazole, 40 mg, intravenous, Daily before breakfast  polyethylene glycol, 17 g, oral, Daily  vancomycin, 1,500 mg, intravenous, q12h      Continuous medications:     PRN medications:  PRN medications: acetaminophen, bisacodyl, bisacodyl, dextrose, dextrose, glucagon, glucagon, guaiFENesin, melatonin, ondansetron **OR** ondansetron, vancomycin     Past Medical History  She has a past medical history of Anxiety and depression, Endometrial carcinoma  (Multi), Hypertension, Hypothyroidism, and Psoriasis.    Surgical History  She has a past surgical history that includes Hysterectomy; Knee Arthroplasty; Carpal tunnel release; and Cholecystectomy.     Social History  She reports that she has never smoked. She has never used smokeless tobacco. She reports that she does not currently use alcohol. She reports that she does not use drugs.    Family History  Family History   Problem Relation Name Age of Onset    Hypertension Mother      Diabetes Father         Allergies  Morphine (pf), Ciprofloxacin, and Penicillin    Code Status  Full Code     Review of Systems   Constitutional:  Positive for appetite change (decreased from baseline but still has a decent appetite), chills (chronic) and fatigue. Negative for fever.   HENT:          She has a cold sore on the outer side of her R lower lip    Eyes:  Negative for photophobia and visual disturbance.   Respiratory:  Negative for cough and shortness of breath.    Cardiovascular:  Positive for leg swelling. Negative for chest pain and palpitations.   Gastrointestinal:  Negative for abdominal pain, blood in stool, nausea and vomiting.   Endocrine: Negative for polydipsia and polyuria.   Genitourinary:  Negative for decreased urine volume, dysuria, hematuria and urgency.        Urinary symptoms from UTI have resolved    Skin:  Positive for color change and wound.        Psoriatic rash evident predominantly on hands presently   Calluses on both feet   Allergic/Immunologic: Positive for immunocompromised state (Methotrexate on Sunday every week).   Neurological:  Negative for dizziness, tremors, syncope, weakness and light-headedness.   Psychiatric/Behavioral:  Negative for confusion and sleep disturbance.    All other systems reviewed and are negative.    INFORMED PHOTO CONSENT:  The patient and/or family member has given verbal consent to have photos taken of RLE and inserted into their provider note as a part of their  "permanent medical record for purposes of documentation, treatment management, and/or medical review. All images taken were transmitted and stored on a secure Epic  Site located within a Media Folder Tab by a registered Epic-Haiku Mobile Application Device. See \"Media\" tab in Epic or photo as below.     Last Recorded Vitals  /75   Pulse 60   Temp 35.7 °C (96.3 °F) (Skin)   Resp 17   Wt 147 kg (325 lb)   SpO2 95%      Physical Exam:  Vital signs and nursing notes reviewed.   Constitutional: Pleasant and cooperative. Laying in bed in no acute distress. Conversant.   Skin: Please see images on RLE below -- the areas are warm to the touch; she has callus formation on both feet at the heels and balls of the feet; a small cold sore near her lower lip on the R side, and some psoriatic patches of skin predominantly on her hands   Eyes: EOMI. Anicteric sclera.   ENT: Mucous membranes moist; no obvious injury or deformity appreciated.   Head and Neck: Normocephalic, atraumatic. ROM preserved. Trachea midline. No appreciable JVD.   Respiratory: Nonlabored on RA. Lungs clear to auscultation bilaterally without obvious adventitious sounds. Chest rise is equal.  Cardiovascular: RRR. No gross murmur, gallop, or rub. Extremities are warm and well-perfused. No chest wall tenderness.   GI: Abdomen soft, obese, nontender, nondistended. No obvious organomegaly appreciated.   : No CVA tenderness.   Extremities: RLE > LLE edema with skin findings as described above and seen below; otherwise no cyanosis, edema, or clubbing evident. Neurovascularly intact.   Neuro: A&Ox3. CN 2-12 grossly intact. Able to respond to questions appropriately and clearly. No acute focal neurologic deficits appreciated.  Psych: Appropriate mood and behavior.        Assessment/Plan     60 y.o. female with PMHx s/f HTN, DLD, T2DM (diet-controlled, last A1c 6.5%), hypothyroidism, psoriasis / psoriatic arthritis (on methotrexate), rosacea, " endometrial cancer s/p hysterectomy, obesity, presenting with worsening RLE swelling, discoloration, and skin sloughing.     Worsening RLE Cellulitis and Skin Sloughing   Failure of outpatient treatment   Immunocompromised patient  Elevated inflammatory markers (ESR, CRP)   -Patient does not meet sepsis criteria on admission   -Source: as above  -Imaging: CT RLE ordered   --DVT US negative on 01/11/25 --> will hold off on repeat for now given significant skin sloughing   -Abx allergies: PCN + Cipro (Hives and Rash to both, confirmed on admission)  -Lactate 1.1   -BP is normotensive   -Blood cultures x2 in process   -Follow fever curve, WBCs  -Continue antibiotic coverage with vanc/cefepime/flagyl until seen by ID   -(+) monitor for sxs of toxicity and/or rxn related to vancomycin; appreciate pharmacy assistance with management   -ID consultation appreciated   -Patient has intact pulses and good sensation to the RLE     Recent UTI   -Sxs have resolved   -Has completed tx with Keflex   -UA ordered in ED, pending     Hyponatremia, mild, chronic   -Na baseline in the lower 130s  -Na 135 on admit    HTN, DLD  -BP: controlled on presentation, home therapies will be continued. Close monitoring and adjust as needed.   -DLD: will be continued on home therapies   -Continued outpatient follow-up as scheduled     DM-II   -Optimize glucose control on admission with current infection   -Continue with SSI ACHS   -Accucheks, hypoglycemic protocol   -Monitor and adjust as needed      Hypothyroidism   -Continue home synthroid     Psoriasis / psoriatic arthritis   -Continue home folic acid   -Takes methotrexate on Sundays     Endometrial cancer s/p hysterectomy  -Continue follow-up as scheduled     Morbid Obesity (BMI 50.90)   -Evidenced by BMI as stated above, complicating all aspects of care including increased utilization of hospital resources   -Continued follow-up with PCP and healthy dietary + lifestyle changes as able       Diet: Cardiac, carb controlled   DVT Prophylaxis: Lovenox, ambulate as tolerate (avoid SCDs to RLE)  Code Status: Full Code   Case Discussed With: ED provider  Additional Sources Reviewed: ED note day of admission + 01/11/2025, follow-up pharmacy phone call documentation, available outpatient primary care notes     Anticipated Length of Stay (LOS): Patient will require >2 midnight stay for management of the above     Chiquis Jimenez PA-C    Dragon dictation software was used to dictate this note and thus there may be minor errors in translation/transcription including garbled speech or misspellings. Please contact for clarification if needed.

## 2025-01-21 NOTE — PROGRESS NOTES
Vancomycin Dosing by Pharmacy- INITIAL    Mercedez Ellington is a 60 y.o. year old female who Pharmacy has been consulted for vancomycin dosing for cellulitis, skin and soft tissue. Based on the patient's indication and renal status this patient will be dosed based on a goal AUC of 500-600.     Renal function is currently stable.    Visit Vitals  /75   Pulse 60   Temp 35.7 °C (96.3 °F) (Skin)   Resp 17        Lab Results   Component Value Date    CREATININE 0.84 2025    CREATININE 1.38 (H) 2025    CREATININE 0.71 2024    CREATININE 0.86 2021        Patient weight is as follows:   Vitals:    25 0940   Weight: 147 kg (325 lb)       Cultures:  No results found for the encounter in last 14 days.        No intake/output data recorded.  I/O during current shift:  No intake/output data recorded.    Temp (24hrs), Av.7 °C (96.3 °F), Min:35.7 °C (96.3 °F), Max:35.7 °C (96.3 °F)         Assessment/Plan     Patient has already been given a loading dose of 2000 mg.  Will initiate vancomycin maintenance,  1500 mg every 12 hours.    This dosing regimen is predicted by [employer] to result in the following pharmacokinetic parameters:  Regimen: 1500 mg IV every 12 hours.  Exposure target: AUC24 (range)400-600 mg/L.hr   [id number]: 500 mg/L.hr  AUC24,ss: 502 mg/L.hr  Probability of AUC24 > 400: 66 %  Ctrough,ss: 12.3 mg/L  Probability of Ctrough,ss > 20: 29 %      Follow-up level will be ordered on  at 5:00 unless clinically indicated sooner.  Will continue to monitor renal function daily while on vancomycin and order serum creatinine at least every 48 hours if not already ordered.  Follow for continued vancomycin needs, clinical response, and signs/symptoms of toxicity.       Luis Felipe Newberry, PharmD

## 2025-01-21 NOTE — ED PROVIDER NOTES
Chief Complaint   Patient presents with    Wound Check       HPI       60 year old female presents to the Emergency Department today complaining of redness and swelling noted to her right lower extremity over the past 2-3 days. Reports that she was evaluated for such in the ED on 1/11/25 when she was diagnosed with cellulitis and an UTI. Placed on Bactrim and Keflex with initial improvement in her right lower extremity symptoms. Notes that she has blisters to the medial and lateral aspects of her right lower extremity that have since worsened. Denies any associated fever, chills, headache, neck pain, chest pain, shortness of breath, abdominal pain, nausea, vomiting, diarrhea, constipation, or urinary symptoms.       History provided by:  Patient             Patient History   Past Medical History:   Diagnosis Date    Anxiety and depression     Endometrial carcinoma (Multi)     Hypertension     Hypothyroidism     Psoriasis      Past Surgical History:   Procedure Laterality Date    CARPAL TUNNEL RELEASE      CHOLECYSTECTOMY      HYSTERECTOMY      KNEE ARTHROPLASTY       Family History   Problem Relation Name Age of Onset    Hypertension Mother      Diabetes Father       Social History     Tobacco Use    Smoking status: Never    Smokeless tobacco: Never   Vaping Use    Vaping status: Never Used   Substance Use Topics    Alcohol use: Not Currently    Drug use: Never           Physical Exam  Constitutional:       Appearance: Normal appearance.   HENT:      Head: Normocephalic.      Right Ear: External ear normal.      Left Ear: External ear normal.      Nose: Nose normal.      Mouth/Throat:      Mouth: Mucous membranes are moist.      Pharynx: Oropharynx is clear. No oropharyngeal exudate or posterior oropharyngeal erythema.   Eyes:      Conjunctiva/sclera: Conjunctivae normal.      Pupils: Pupils are equal, round, and reactive to light.   Cardiovascular:      Rate and Rhythm: Normal rate and regular rhythm.      Pulses:            Radial pulses are 3+ on the right side and 3+ on the left side.        Dorsalis pedis pulses are 3+ on the right side and 3+ on the left side.      Heart sounds: Normal heart sounds. No murmur heard.     No friction rub. No gallop.   Pulmonary:      Effort: Pulmonary effort is normal. No respiratory distress.      Breath sounds: Normal breath sounds. No wheezing, rhonchi or rales.   Abdominal:      General: Abdomen is flat. Bowel sounds are normal.      Palpations: Abdomen is soft.      Tenderness: There is no abdominal tenderness. There is no right CVA tenderness, left CVA tenderness, guarding or rebound. Negative signs include Martínez's sign and McBurney's sign.   Musculoskeletal:         General: No swelling or deformity.      Cervical back: Full passive range of motion without pain.      Right lower leg: No edema.      Left lower leg: No edema.   Lymphadenopathy:      Cervical: No cervical adenopathy.   Skin:     Capillary Refill: Capillary refill takes less than 2 seconds.      Coloration: Skin is not jaundiced.      Findings: No rash.      Comments: Diffuse redness and warmth to the right lower extremity with open blisters and purplish color. Right dorsalis pedis pulse is strong and regular. Capillary refill was within normal limits. Sensation is intact distally.    Neurological:      General: No focal deficit present.      Mental Status: She is alert and oriented to person, place, and time. Mental status is at baseline.      Gait: Gait is intact.   Psychiatric:         Mood and Affect: Mood normal.         Behavior: Behavior is cooperative.         Labs Reviewed   CBC WITH AUTO DIFFERENTIAL - Abnormal       Result Value    WBC 8.2      nRBC 0.0      RBC 4.42      Hemoglobin 13.2      Hematocrit 40.8      MCV 92      MCH 29.9      MCHC 32.4      RDW 16.1 (*)     Platelets 441      Neutrophils % 80.4      Immature Granulocytes %, Automated 0.9      Lymphocytes % 10.7      Monocytes % 4.6       Eosinophils % 2.8      Basophils % 0.6      Neutrophils Absolute 6.60      Immature Granulocytes Absolute, Automated 0.07      Lymphocytes Absolute 0.88 (*)     Monocytes Absolute 0.38      Eosinophils Absolute 0.23      Basophils Absolute 0.05     COMPREHENSIVE METABOLIC PANEL - Abnormal    Glucose 98      Sodium 135 (*)     Potassium 3.8      Chloride 103      Bicarbonate 27      Anion Gap 9 (*)     Urea Nitrogen 10      Creatinine 0.84      eGFR 79      Calcium 8.5 (*)     Albumin 3.3 (*)     Alkaline Phosphatase 105      Total Protein 6.8      AST 29      Bilirubin, Total 0.5      ALT 20     SEDIMENTATION RATE, AUTOMATED - Abnormal    Sedimentation Rate 108 (*)    C-REACTIVE PROTEIN - Abnormal    C-Reactive Protein 13.49 (*)    LACTATE - Normal    Lactate 1.1      Narrative:     Venipuncture immediately after or during the administration of Metamizole may lead to falsely low results. Testing should be performed immediately prior to Metamizole dosing.   BLOOD CULTURE   BLOOD CULTURE   POCT GLUCOSE METER   POCT GLUCOSE METER   POCT GLUCOSE METER   POCT GLUCOSE METER   MORPHOLOGY    RBC Morphology No significant RBC morphology present         No orders to display            ED Course & MDM   Diagnoses as of 01/21/25 1425   Cellulitis of right lower extremity           Medical Decision Making  Patient was seen and evaluated by Dr. Jurado. Saline lock was established with labs drawn and results as above. Blood cultures x 2 were drawn and pending. Blood counts, electrolytes, kidney function, lactate, and liver function were all unremarkable. Treated her cellulitis with Ancef and Vancomycin. Record review shows that she had a negative DVT on 1/11/25. With her failure of outpatient therapy, we feel that she requires admission. Case was discussed with BRIDGER Ruiz PA-C, who agrees to place the patient under 23 hour observation for further evaluation and care. Will be transferred to the medical floor in stable  condition.     Diagnostic Impression:    1. Acute cellulitis of the right lower extremity with failure of outpatient therapy    2. IV meds in ED           Your medication list        ASK your doctor about these medications        Instructions Last Dose Given Next Dose Due   celecoxib 200 mg capsule  Commonly known as: CeleBREX      Take 1 capsule (200 mg) by mouth once daily.       cephalexin 500 mg capsule  Commonly known as: Keflex      Take 1 capsule (500 mg) by mouth 2 times a day for 10 days.       escitalopram 20 mg tablet  Commonly known as: Lexapro           folic acid 1 mg tablet  Commonly known as: Folvite      Take 1 tablet (1 mg) by mouth early in the morning..       furosemide 20 mg tablet  Commonly known as: Lasix           levothyroxine 125 mcg tablet  Commonly known as: Synthroid, Levoxyl           methotrexate 2.5 mg tablet  Commonly known as: Trexall      Take 6 tablets (15 mg total) by mouth 1 (one) time per week.       metoprolol succinate XL 50 mg 24 hr tablet  Commonly known as: Toprol-XL           sulfamethoxazole-trimethoprim 800-160 mg tablet  Commonly known as: Bactrim DS      Take 1 tablet by mouth 2 times a day for 10 days.                  Procedure  Procedures     Jacques Crooks, MATT-CNP  01/21/25 1819

## 2025-01-21 NOTE — PROGRESS NOTES
Mercedez Ellington is a 60 y.o. female admitted for Cellulitis of right lower extremity. Pharmacy reviewed the patient's [id number] medications and allergies for accuracy.    The list below reflects the PTA list prior to pharmacy medication history. A summary a changes to the PTA medication list has been listed below. Please review each medication in order reconciliation for additional clarification and justification.    Source of information:  Pharmacy  No Changes!  Medications added:    Medications modified:    Medications to be removed:    Medications of concern:      Prior to Admission Medications   Prescriptions Last Dose Informant Patient Reported? Taking?   celecoxib (CeleBREX) 200 mg capsule   No No   Sig: Take 1 capsule (200 mg) by mouth once daily.   cephalexin (Keflex) 500 mg capsule   No No   Sig: Take 1 capsule (500 mg) by mouth 2 times a day for 10 days.   escitalopram (Lexapro) 20 mg tablet   Yes No   Sig: Take 1 tablet (20 mg) by mouth once daily.   folic acid (Folvite) 1 mg tablet   No No   Sig: Take 1 tablet (1 mg) by mouth early in the morning..   furosemide (Lasix) 20 mg tablet   Yes No   Si tablet (20 mg) once daily.   levothyroxine (Synthroid, Levoxyl) 125 mcg tablet   Yes No   Sig: Take 1 tablet (125 mcg) by mouth early in the morning.. Take on an empty stomach at the same time each day, either 30 to 60 minutes prior to breakfast   methotrexate (Trexall) 2.5 mg tablet   No No   Sig: Take 6 tablets (15 mg total) by mouth 1 (one) time per week.   metoprolol succinate XL (Toprol-XL) 50 mg 24 hr tablet   Yes No   Sig: Take 1 tablet (50 mg) by mouth once daily. Do not crush or chew.   sulfamethoxazole-trimethoprim (Bactrim DS) 800-160 mg tablet   No No   Sig: Take 1 tablet by mouth 2 times a day for 10 days.      Facility-Administered Medications: None       Sanaz Cadet

## 2025-01-22 LAB
ALBUMIN SERPL BCP-MCNC: 3 G/DL (ref 3.4–5)
ALP SERPL-CCNC: 97 U/L (ref 33–136)
ALT SERPL W P-5'-P-CCNC: 22 U/L (ref 7–45)
ANION GAP SERPL CALC-SCNC: 8 MMOL/L (ref 10–20)
AST SERPL W P-5'-P-CCNC: 35 U/L (ref 9–39)
BASOPHILS # BLD AUTO: 0.03 X10*3/UL (ref 0–0.1)
BASOPHILS NFR BLD AUTO: 0.3 %
BILIRUB SERPL-MCNC: 0.6 MG/DL (ref 0–1.2)
BUN SERPL-MCNC: 12 MG/DL (ref 6–23)
CALCIUM SERPL-MCNC: 8.3 MG/DL (ref 8.6–10.3)
CHLORIDE SERPL-SCNC: 100 MMOL/L (ref 98–107)
CO2 SERPL-SCNC: 31 MMOL/L (ref 21–32)
CREAT SERPL-MCNC: 0.87 MG/DL (ref 0.5–1.05)
EGFRCR SERPLBLD CKD-EPI 2021: 75 ML/MIN/1.73M*2
EOSINOPHIL # BLD AUTO: 0.25 X10*3/UL (ref 0–0.7)
EOSINOPHIL NFR BLD AUTO: 2.6 %
ERYTHROCYTE [DISTWIDTH] IN BLOOD BY AUTOMATED COUNT: 16 % (ref 11.5–14.5)
GLUCOSE BLD MANUAL STRIP-MCNC: 126 MG/DL (ref 74–99)
GLUCOSE BLD MANUAL STRIP-MCNC: 83 MG/DL (ref 74–99)
GLUCOSE BLD MANUAL STRIP-MCNC: 88 MG/DL (ref 74–99)
GLUCOSE BLD MANUAL STRIP-MCNC: 99 MG/DL (ref 74–99)
GLUCOSE SERPL-MCNC: 91 MG/DL (ref 74–99)
HCT VFR BLD AUTO: 38.2 % (ref 36–46)
HGB BLD-MCNC: 12.2 G/DL (ref 12–16)
IMM GRANULOCYTES # BLD AUTO: 0.08 X10*3/UL (ref 0–0.7)
IMM GRANULOCYTES NFR BLD AUTO: 0.8 % (ref 0–0.9)
LYMPHOCYTES # BLD AUTO: 0.52 X10*3/UL (ref 1.2–4.8)
LYMPHOCYTES NFR BLD AUTO: 5.3 %
MAGNESIUM SERPL-MCNC: 2.18 MG/DL (ref 1.6–2.4)
MCH RBC QN AUTO: 30.4 PG (ref 26–34)
MCHC RBC AUTO-ENTMCNC: 31.9 G/DL (ref 32–36)
MCV RBC AUTO: 95 FL (ref 80–100)
MONOCYTES # BLD AUTO: 0.51 X10*3/UL (ref 0.1–1)
MONOCYTES NFR BLD AUTO: 5.2 %
NEUTROPHILS # BLD AUTO: 8.33 X10*3/UL (ref 1.2–7.7)
NEUTROPHILS NFR BLD AUTO: 85.8 %
NRBC BLD-RTO: 0 /100 WBCS (ref 0–0)
PLATELET # BLD AUTO: 442 X10*3/UL (ref 150–450)
POTASSIUM SERPL-SCNC: 4.4 MMOL/L (ref 3.5–5.3)
PROT SERPL-MCNC: 6.5 G/DL (ref 6.4–8.2)
RBC # BLD AUTO: 4.01 X10*6/UL (ref 4–5.2)
SODIUM SERPL-SCNC: 135 MMOL/L (ref 136–145)
VANCOMYCIN SERPL-MCNC: 20.6 UG/ML (ref 5–20)
WBC # BLD AUTO: 9.7 X10*3/UL (ref 4.4–11.3)

## 2025-01-22 PROCEDURE — 2500000001 HC RX 250 WO HCPCS SELF ADMINISTERED DRUGS (ALT 637 FOR MEDICARE OP): Performed by: STUDENT IN AN ORGANIZED HEALTH CARE EDUCATION/TRAINING PROGRAM

## 2025-01-22 PROCEDURE — 99233 SBSQ HOSP IP/OBS HIGH 50: CPT | Performed by: HOSPITALIST

## 2025-01-22 PROCEDURE — 1100000001 HC PRIVATE ROOM DAILY

## 2025-01-22 PROCEDURE — 2500000002 HC RX 250 W HCPCS SELF ADMINISTERED DRUGS (ALT 637 FOR MEDICARE OP, ALT 636 FOR OP/ED): Performed by: STUDENT IN AN ORGANIZED HEALTH CARE EDUCATION/TRAINING PROGRAM

## 2025-01-22 PROCEDURE — 2500000004 HC RX 250 GENERAL PHARMACY W/ HCPCS (ALT 636 FOR OP/ED): Mod: JZ | Performed by: STUDENT IN AN ORGANIZED HEALTH CARE EDUCATION/TRAINING PROGRAM

## 2025-01-22 PROCEDURE — 85025 COMPLETE CBC W/AUTO DIFF WBC: CPT | Performed by: STUDENT IN AN ORGANIZED HEALTH CARE EDUCATION/TRAINING PROGRAM

## 2025-01-22 PROCEDURE — 80053 COMPREHEN METABOLIC PANEL: CPT | Performed by: STUDENT IN AN ORGANIZED HEALTH CARE EDUCATION/TRAINING PROGRAM

## 2025-01-22 PROCEDURE — 82947 ASSAY GLUCOSE BLOOD QUANT: CPT

## 2025-01-22 PROCEDURE — 87075 CULTR BACTERIA EXCEPT BLOOD: CPT | Mod: PORLAB | Performed by: HOSPITALIST

## 2025-01-22 PROCEDURE — 36415 COLL VENOUS BLD VENIPUNCTURE: CPT | Performed by: STUDENT IN AN ORGANIZED HEALTH CARE EDUCATION/TRAINING PROGRAM

## 2025-01-22 PROCEDURE — 83735 ASSAY OF MAGNESIUM: CPT | Performed by: STUDENT IN AN ORGANIZED HEALTH CARE EDUCATION/TRAINING PROGRAM

## 2025-01-22 PROCEDURE — 80202 ASSAY OF VANCOMYCIN: CPT | Performed by: STUDENT IN AN ORGANIZED HEALTH CARE EDUCATION/TRAINING PROGRAM

## 2025-01-22 RX ADMIN — ONDANSETRON 4 MG: 2 INJECTION INTRAMUSCULAR; INTRAVENOUS at 02:12

## 2025-01-22 RX ADMIN — ENOXAPARIN SODIUM 60 MG: 60 INJECTION SUBCUTANEOUS at 18:39

## 2025-01-22 RX ADMIN — PANTOPRAZOLE SODIUM 40 MG: 40 TABLET, DELAYED RELEASE ORAL at 06:45

## 2025-01-22 RX ADMIN — METRONIDAZOLE 500 MG: 500 INJECTION, SOLUTION INTRAVENOUS at 11:53

## 2025-01-22 RX ADMIN — CEFEPIME 2 G: 1 INJECTION, SOLUTION INTRAVENOUS at 18:39

## 2025-01-22 RX ADMIN — ONDANSETRON 4 MG: 2 INJECTION INTRAMUSCULAR; INTRAVENOUS at 12:02

## 2025-01-22 RX ADMIN — METRONIDAZOLE 500 MG: 500 INJECTION, SOLUTION INTRAVENOUS at 21:09

## 2025-01-22 RX ADMIN — ENOXAPARIN SODIUM 60 MG: 60 INJECTION SUBCUTANEOUS at 05:13

## 2025-01-22 RX ADMIN — FOLIC ACID 1 MG: 1 TABLET ORAL at 06:45

## 2025-01-22 RX ADMIN — ESCITALOPRAM OXALATE 20 MG: 10 TABLET ORAL at 08:31

## 2025-01-22 RX ADMIN — VANCOMYCIN HYDROCHLORIDE 1500 MG: 1.5 INJECTION, POWDER, LYOPHILIZED, FOR SOLUTION INTRAVENOUS at 13:01

## 2025-01-22 RX ADMIN — VANCOMYCIN HYDROCHLORIDE 1500 MG: 1.5 INJECTION, POWDER, LYOPHILIZED, FOR SOLUTION INTRAVENOUS at 23:25

## 2025-01-22 RX ADMIN — ONDANSETRON 4 MG: 2 INJECTION INTRAMUSCULAR; INTRAVENOUS at 21:09

## 2025-01-22 RX ADMIN — FUROSEMIDE 20 MG: 20 TABLET ORAL at 08:31

## 2025-01-22 RX ADMIN — METRONIDAZOLE 500 MG: 500 INJECTION, SOLUTION INTRAVENOUS at 02:13

## 2025-01-22 RX ADMIN — CEFEPIME 2 G: 1 INJECTION, SOLUTION INTRAVENOUS at 11:14

## 2025-01-22 RX ADMIN — CEFEPIME 2 G: 1 INJECTION, SOLUTION INTRAVENOUS at 01:28

## 2025-01-22 RX ADMIN — METOPROLOL SUCCINATE 50 MG: 50 TABLET, EXTENDED RELEASE ORAL at 08:31

## 2025-01-22 RX ADMIN — LEVOTHYROXINE SODIUM 125 MCG: 0.12 TABLET ORAL at 05:13

## 2025-01-22 ASSESSMENT — COGNITIVE AND FUNCTIONAL STATUS - GENERAL
DAILY ACTIVITIY SCORE: 24
DAILY ACTIVITIY SCORE: 24
MOBILITY SCORE: 24
MOBILITY SCORE: 24

## 2025-01-22 ASSESSMENT — PAIN - FUNCTIONAL ASSESSMENT
PAIN_FUNCTIONAL_ASSESSMENT: 0-10
PAIN_FUNCTIONAL_ASSESSMENT: 0-10

## 2025-01-22 ASSESSMENT — PAIN SCALES - GENERAL
PAINLEVEL_OUTOF10: 3
PAINLEVEL_OUTOF10: 0 - NO PAIN

## 2025-01-22 NOTE — CARE PLAN
The patient's goals for the shift include      The clinical goals for the shift include Patient will rate pain <3/10 by end of shift.

## 2025-01-22 NOTE — CONSULTS
Wound Care Consult     Visit Date: 1/22/2025      Patient Name: Mercedez Ellington         MRN: [id number]           YOB: 1965      Pertinent Labs:   Albumin   Date Value Ref Range Status   01/22/2025 3.0 (L) 3.4 - 5.0 g/dL Final       Wound Assessment:  Wound 01/21/25 Other (comment) Leg Right;Medial (Active)   Wound Image   01/22/25 1119   Site Assessment Red;Tan;Yellow;Sloughing;Brown;Fragile 01/22/25 1119   Non-staged Wound Description Partial thickness 01/22/25 1119   Shape irregular 01/22/25 1119   Wound Length (cm) 31 cm 01/22/25 1119   Wound Width (cm) 16 cm 01/22/25 1119   Wound Surface Area (cm^2) 496 cm^2 01/22/25 1119   Drainage Description Yellow 01/22/25 1119   Drainage Amount Moderate 01/22/25 1119   Dressing Open to air 01/22/25 1119       Wound 01/21/25 Other (comment) Leg Right;Lateral (Active)   Wound Image   01/22/25 1119   Site Assessment Brown;Yellow;Sloughing;Red;Denuded;Fragile 01/22/25 1119   Non-staged Wound Description Partial thickness 01/22/25 1119   Shape irregular 01/22/25 1119   Wound Length (cm) 30 cm 01/22/25 1119   Wound Width (cm) 16 cm 01/22/25 1119   Wound Surface Area (cm^2) 480 cm^2 01/22/25 1119   Wound Depth (cm) 0.1 cm 01/22/25 1119   Wound Volume (cm^3) 48 cm^3 01/22/25 1119   Drainage Description Yellow 01/22/25 1119   Drainage Amount Moderate 01/22/25 1119   Dressing Open to air 01/22/25 1119     Patient seen for Right lower leg cellulitis/wounds (present on admission) complicated by PMH: HTN, DLD, T2DM (diet-controlled, last A1c 6.5%), hypothyroidism, psoriasis / psoriatic arthritis (on methotrexate), rosacea, endometrial cancer s/p hysterectomy and obesity. Exam conducted with bedside JENNY Sim. “Pt was initially seen in the ED 01/15/25 for RLE swelling and groin pain, was diagnosed with UTI and RLE cellulitis (had a negative DVT US). She was discharged on Keflex and Bactrim.” Patient states she was applying topical triple antibiotic ointment and wrapping her  leg with gauze and ace bandage. She reports she missed a dressing change and then noticed blistering and increased redness prompting her to come to ED. Skin noted to be fragile to right lower leg with discoloration, skin denuded/sloughing and swelling noted, yellow drainage noted. Patient states ID advised her to leave wounds open to air. Secure chat sent to Dr. Marie for verification and plan of care discussed, he advised to leave wound open to air for 2 days then can apply dressing care. Culture ordered after discussing plan of care with ID. See detailed assessment above from flowsheet. Recommendations below, reviewed with Dr. Mcmahon.     Treatment protocols recommended:  Right lower leg- Leave PATTI on clean chux per ID until 1/24 then cleanse with vashe, cover with xeroform, ABD and wrap with kerlix/ACE. Change dressing daily/prn. Elevate when resting.   Continue to off load, turning at least every 2 hours. Offload heels.     Therapeutic surface: Patient on [employer] standard pressure relieving mattress during exam. Venkatesh 21.       Nursing updated, continue pressure injury preventions, wound care to be completed by nursing per orders and re-consult wound RN if needed.    Please contact me with questions or changes in patient condition. I recommend follow up with [facility] upon discharge.     Rebeka Peoples. RN  Wound/Ostomy Care  [phone]

## 2025-01-22 NOTE — PROGRESS NOTES
"Vancomycin Dosing by Pharmacy- FOLLOW UP    Mercedez Ellington is a 60 y.o. year old female who Pharmacy has been consulted for vancomycin dosing for Vancomycin Indications: Skin & Soft Tissue. Based on the patient's indication and renal status this patient will be dosed based on a goal AUC of 400-600.     Renal function is currently stable.    Current vancomycin dose:  1500 mg every 12 hours    Estimated vancomycin AUC on current dose: 502 mg/L.hr     Visit Vitals  /72 (BP Location: Left arm, Patient Position: Lying)   Pulse 59   Temp 36 °C (96.8 °F) (Temporal)   Resp 19           Lab Results   Component Value Date    CREATININE 0.87 01/22/2025    CREATININE 0.84 01/21/2025    CREATININE 1.38 (H) 01/11/2025    CREATININE 0.71 07/26/2024       Patient weight is No results found for: \"PTWEIGHT\"    No results found for: \"CULTURE\"    I/O last 3 completed shifts:  In: 980 (6.6 mL/kg) [P.O.:240; IV Piggyback:740]  Out: - (0 mL/kg)   Weight: 147.4 kg     No results found for: \"PATIENTTEMP\"       Assessment/Plan     Within goal AUC range. Continue current vancomycin regimen.    This dosing regimen is predicted by [employer] to result in the following pharmacokinetic parameters:  Loading dose: N/A  Regimen: 1500 mg IV every 12 hours.  Start time: 11:54 on 01/22/2025  Exposure target: AUC24 (range)400-600 mg/L.hr   [id number]: 502 mg/L.hr  AUC24,ss: 503 mg/L.hr  Probability of AUC24 > 400: 80 %  Ctrough,ss: 12.7 mg/L  Probability of Ctrough,ss > 20: 23 %    The next level will be obtained on 1/26 at 0500. May be obtained sooner if clinically indicated.   Will continue to monitor renal function daily while on vancomycin and order serum creatinine at least every 48 hours if not already ordered.  Follow for continued vancomycin needs, clinical response, and signs/symptoms of toxicity.     Jack GalarzaD, BCPS      "

## 2025-01-22 NOTE — PROGRESS NOTES
Social work consult placed for discharge planning. SW reviewed pt's chart and communicated with [facility]. No SW needs foreseen at this time. SW signing off; available upon request.    DEIRDRE Thompson ([id number])   Care Transitions

## 2025-01-22 NOTE — CONSULTS
"Nutrition Initial Assessment:   Nutrition Assessment    Reason for Assessment: Admission nursing screening    Patient is a 60 y.o. female presenting with a wound check. Pt complaining of redness and swelling to Right LE over the past few days. Diagnosed with cellulitis on 1/11/25. Now noting blisters to the medial and lateral aspects of her right lower extremity has since worsened. Denies any GI issues at this time. Consulted by [facility] for wounds. Pt also with poor appetite since admission. No PO intakes above 50%. Rec; Ensure plus HP 3x daily with meals providing an additional 350 kcals and 22g of protein each. Weights stable within 10 lbs +/-. Continue to monitor PO intakes.     Nutrition History:  Energy Intake: Poor < 50 %  Pain affecting nutrition status: Yes  If yes, Informed Nursing: Yes  Food and Nutrient History: Pt diagnosed with cellulitis on 1/11, wound worsening. Poor PO over the past few days. Rec; ensure plus HP for wound and poor PO.  3x daily with meals.     Anthropometrics:  Height: 170.2 cm (5' 7\")   Weight: 147 kg (325 lb)   BMI (Calculated): 50.89    Weight Change %:  Weight History / % Weight Change: No significant weight changes noted.    Nutrition Focused Physical Exam Findings:  Subcutaneous Fat Loss:   Orbital Fat Pads: Mild-Moderate (slight dark circles and slight hollowing)  Buccal Fat Pads: Mild-Moderate (flat cheeks, minimal bounce)  Muscle Wasting:  Temporalis: Well nourished (well-defined muscle)  Pectoralis (Clavicular Region): Well nourished (clavicle not visible)  Edema:     Physical Findings:  Hair: Negative  Eyes: Negative  Nails: Negative  Skin: Positive  Positive Skin Findings:  (Cellulitis.)    Nutrition Significant Labs:  CBC Trend:   Results from last 7 days   Lab Units 01/22/25  0512 01/21/25  1043   WBC AUTO x10*3/uL 9.7 8.2   RBC AUTO x10*6/uL 4.01 4.42   HEMOGLOBIN g/dL 12.2 13.2   HEMATOCRIT % 38.2 40.8   MCV fL 95 92   PLATELETS AUTO x10*3/uL 442 441    , BMP Trend: "   Results from last 7 days   Lab Units 01/22/25  0512 01/21/25  1043   GLUCOSE mg/dL 91 98   CALCIUM mg/dL 8.3* 8.5*   SODIUM mmol/L 135* 135*   POTASSIUM mmol/L 4.4 3.8   CO2 mmol/L 31 27   CHLORIDE mmol/L 100 103   BUN mg/dL 12 10   CREATININE mg/dL 0.87 0.84    , A1C:  Lab Results   Component Value Date    HGBA1C 6.5 (H) 10/16/2024       Nutrition Specific Medications:  cefepime, 2 g, intravenous, q8h  enoxaparin, 60 mg, subcutaneous, q12h SUSHILA  escitalopram, 20 mg, oral, Daily  folic acid, 1 mg, oral, Daily  furosemide, 20 mg, oral, Daily  insulin lispro, 0-10 Units, subcutaneous, Before meals & nightly  levothyroxine, 125 mcg, oral, Daily  metoprolol succinate XL, 50 mg, oral, Daily  metroNIDAZOLE, 500 mg, intravenous, q8h  pantoprazole, 40 mg, oral, Daily before breakfast   Or  pantoprazole, 40 mg, intravenous, Daily before breakfast  polyethylene glycol, 17 g, oral, Daily  vancomycin, 1,500 mg, intravenous, q12h      Dietary Orders (From admission, onward)       Start     Ordered    01/22/25 1332  Oral nutritional supplements  Until discontinued        Question Answer Comment   Deliver with Breakfast    Deliver with Dinner    Deliver with Lunch    Select supplement: Ensure Plus High Protein        01/22/25 1331    01/21/25 1341  May Participate in Room Service  ( ROOM SERVICE MAY PARTICIPATE)  Once        Question:  .  Answer:  Yes    01/21/25 1340    01/21/25 1331  Adult diet Cardiac; 70 gm fat; 2 - 3 grams Sodium  Diet effective now        Question Answer Comment   Diet type Cardiac    Fat restriction: 70 gm fat    Sodium restriction: 2 - 3 grams Sodium        01/21/25 1333                     Estimated Needs:   Total Energy Estimated Needs in 24 hours (kCal): 2397 kCal  Method for Estimating Needs: 25kcals/kg ABW  Total Protein Estimated Needs in 24 Hours (g): 96 g  Method for Estimating 24 Hour Protein Needs: 1g/kg ABW  Total Fluid Estimated Needs in 24 Hours (mL): 2397 mL  Method for Estimating 24 Hour  Fluid Needs: 1 ml/kcal or per MD        Nutrition Diagnosis        Nutrition Diagnosis  Patient has Nutrition Diagnosis: Yes  Diagnosis Status (1): New  Nutrition Diagnosis 1: Increased nutrient needs  Related to (1): increased metabolic demand secondary to healing  As Evidenced by (1): Cellulitis       Nutrition Interventions/Recommendations   Nutrition prescription for oral nutrition    Nutrition Recommendations:  Individualized Nutrition Prescription Provided for : Individualized nutrition prescription of 2397 kcals and 96g of protein to be provided with diet order. Continue with Cons CHO diet and 70g Fat, 2-3g Na diet. Ensure plus HP 3x with meals for wounds and poor PO.    Nutrition Interventions/Goals:   Interventions: Meals and snacks, Medical food supplement  Meals and Snacks: Carbohydrate-modified diet, Fat-modified diet  Goal: Consume 3 meals daily.  Medical Food Supplement: Commercial beverage medical food supplement therapy  Goal: ensure plus HP 3x with meals.      Education Documentation  No documentation found.    N/a    Nutrition Monitoring and Evaluation   Food/Nutrient Related History Monitoring  Monitoring and Evaluation Plan: Estimated Energy Intake  Estimated Energy Intake: Energy intake greater or equal to 75% of estimated energy needs  Additional Plans: Ensure plus HP 3x with meals.    Anthropometric Measurements  Monitoring and Evaluation Plan: Body weight  Body Weight: Body weight - Maintain stable weight    Biochemical Data, Medical Tests and Procedures  Monitoring and Evaluation Plan: Glucose/endocrine profile, Electrolyte/renal panel  Electrolyte and Renal Panel: Potassium, Phosphorus, Sodium  Glucose/Endocrine Profile: Glucose within normal limits ( mg/dL)    Time Spent (min): 60 minutes

## 2025-01-22 NOTE — PROGRESS NOTES
01/22/25 1501   Discharge Planning   Living Arrangements Spouse/significant other;Children   Support Systems Spouse/significant other;Children   Assistance Needed none   Type of Residence Private residence   Home or Post Acute Services None   Expected Discharge Disposition Home   Does the patient need discharge transport arranged? No   Stroke Family Assessment   Stroke Family Assessment Needed No   Intensity of Service   Intensity of Service 0-30 min     Discharge planning assessment completed with patient. Patient lives with her  and sons at home. She is independent with mobility and care needs at home. She follows with PCP Joellen Sanon. Patient is planning to return home at discharge. We discussed the possible need for long term IV ATBX. Patient is hopeful she will be able to discharge home on oral meds. She has experience with IV ATBX for her son as well as other family members and is familiar with the process. [facility] following.

## 2025-01-22 NOTE — CONSULTS
Infectious Disease Inpatient Consult    Inpatient consult to Infectious Diseases  Consult performed by: Mir Marie MD  Consult ordered by: Chiquis Jimenez PA-C          Primary MD: Joellen Sanon MD    Reason For Consult  RLE cellulitis      Assessment/Plan:    #Right lower extremity cellulitis  #Hx of psoriasis on MTX hypothyroidism  Patient only got Keflex 500 mg every 12 hours which likely led to the antibiotic failure. Clinically, does not look like necrotizing fasciitis.    Hypothyroidism  Diet-controlled diabetes  Endometrial cancer s/p hysterectomy  Obesity     Recommendations:    -Cont vancomycin, cefepime and flagyl  -Will optimize antibiotic based on CT scan results  -Pending CT RLE  -Monitor blood cultures  -Monitor superficial wound/fluid cultures  -Thank you for consult.  Will continue to follow    Mir Marie MD  Date of service: 1/22/2025  Time of service: 8:52 AM      History Of Present Illness  Mercedez Ellington is a 60 y.o. female with PMHx of Anxiety, depression, HTN, hypothyroidism, psoriasis presented to the hospital with concern for right lower extremity cellulitis.  Patient was recently in the ED for phlebitis on 1/11 and was prescribed Bactrim and Keflex.  Patient states that her symptoms were slowly improving.  She had been put triple antibiotic ointment on the skin and wrapped her legs and did not open the dressing for next 2 days.  When the dressing was open, patient felt like the infection has gotten worse and came to the ED for further evaluation.    On admission, vital stable.  Labs with WBC count 8.2, potassium 3.8, creatinine 0.8, AST ALT normal, lactic acid 1.1.  Blood cultures were drawn and patient was started on cefepime, vancomycin, Flagyl.  CT scan of the right lower extremity was done now with pending read.  ID consulted for further antibiotic recommendation     Past Medical History  She has a past medical history of Anxiety and depression, Endometrial  carcinoma (Multi), Hypertension, Hypothyroidism, and Psoriasis.    Surgical History  She has a past surgical history that includes Hysterectomy; Knee Arthroplasty; Carpal tunnel release; and Cholecystectomy.     Social History     Occupational History    Not on file   Tobacco Use    Smoking status: Never    Smokeless tobacco: Never   Vaping Use    Vaping status: Never Used   Substance and Sexual Activity    Alcohol use: Not Currently    Drug use: Never    Sexual activity: Defer     Travel History   Travel since 12/22/24    No documented travel since 12/22/24              Family History  Family History   Problem Relation Name Age of Onset    Hypertension Mother      Diabetes Father       Allergies  Morphine (pf), Ciprofloxacin, and Penicillin       There is no immunization history on file for this patient.  Medications  Home medications:  Medications Prior to Admission   Medication Sig Dispense Refill Last Dose/Taking    celecoxib (CeleBREX) 200 mg capsule Take 1 capsule (200 mg) by mouth once daily. 30 capsule 2     cephalexin (Keflex) 500 mg capsule Take 1 capsule (500 mg) by mouth 2 times a day for 10 days. 20 capsule 0     escitalopram (Lexapro) 20 mg tablet Take 1 tablet (20 mg) by mouth once daily.       folic acid (Folvite) 1 mg tablet Take 1 tablet (1 mg) by mouth early in the morning.. 90 tablet 0     furosemide (Lasix) 20 mg tablet 1 tablet (20 mg) once daily.       levothyroxine (Synthroid, Levoxyl) 125 mcg tablet Take 1 tablet (125 mcg) by mouth early in the morning.. Take on an empty stomach at the same time each day, either 30 to 60 minutes prior to breakfast       methotrexate (Trexall) 2.5 mg tablet Take 6 tablets (15 mg total) by mouth 1 (one) time per week. 72 tablet 0     metoprolol succinate XL (Toprol-XL) 50 mg 24 hr tablet Take 1 tablet (50 mg) by mouth once daily. Do not crush or chew.       sulfamethoxazole-trimethoprim (Bactrim DS) 800-160 mg tablet Take 1 tablet by mouth 2 times a day for 10  "days. 20 tablet 0      Current medications:  Scheduled medications  cefepime, 2 g, intravenous, q8h  enoxaparin, 60 mg, subcutaneous, q12h SUSHILA  escitalopram, 20 mg, oral, Daily  folic acid, 1 mg, oral, Daily  furosemide, 20 mg, oral, Daily  insulin lispro, 0-10 Units, subcutaneous, Before meals & nightly  levothyroxine, 125 mcg, oral, Daily  metoprolol succinate XL, 50 mg, oral, Daily  metroNIDAZOLE, 500 mg, intravenous, q8h  pantoprazole, 40 mg, oral, Daily before breakfast   Or  pantoprazole, 40 mg, intravenous, Daily before breakfast  polyethylene glycol, 17 g, oral, Daily  vancomycin, 1,500 mg, intravenous, q12h      Continuous medications     PRN medications  PRN medications: acetaminophen, bisacodyl, bisacodyl, dextrose, dextrose, glucagon, glucagon, guaiFENesin, melatonin, ondansetron **OR** ondansetron, oxyCODONE, oxyCODONE-acetaminophen, vancomycin    Review of Systems     Constitutional:  Denies appetite change, chills, fatigue, fever.  HENT:  Denies ear discharge, ear pain, sore throat    Eyes:  Denies photophobia, eye drainage  Respiratory:  Denies cough, choking, chest tightness, shortness of breath  Cardiovascular:  Denies chest pain, palpitations  Gastrointestinal:  Denies abdominal pain, diarrhea, nausea and vomiting.   Genitourinary:  Denies dysuria, flank pain, frequency  Musculoskeletal:  Denies joint pain  Skin: Right lower extremity erythema, swelling, blisters  Neurological:  Denies light-headedness, numbness and headaches.    Objective  Range of Vitals (last 24 hours)  Heart Rate:  [56-72]   Temp:  [35.7 °C (96.3 °F)-36.4 °C (97.5 °F)]   Resp:  [17-21]   BP: (121-166)/(64-88)   Height:  [170.2 cm (5' 7\")]   Weight:  [147 kg (325 lb)]   SpO2:  [94 %-96 %]   Daily Weight  01/21/25 : 147 kg (325 lb)    Body mass index is 50.9 kg/m².     Physical Exam  /72 (BP Location: Left arm, Patient Position: Lying)   Pulse 59   Temp 36 °C (96.8 °F) (Temporal)   Resp 19   Ht 1.702 m (5' 7\")   Wt " "147 kg (325 lb)   SpO2 94%   BMI 50.90 kg/m²   Temp (24hrs), Av.1 °C (96.9 °F), Min:35.7 °C (96.3 °F), Max:36.4 °C (97.5 °F)      General: alert, oriented, NAD  HEENT: No conjunctival pallor, no scleral icterus  Neck: No LAD, No JVD  Abdomen: soft, non tender, non distended, BS+  Neuro: AAO x 3, PERRL, CN grossly intact  Extremities: R edema, redness, swelling, warmLE, ruptured blisters with yellow/brown drainage  Skin: As above  MSK: No joint inflammation     Relevant Results    Labs  Results from last 72 hours   Lab Units 25  0512 25  1043   WBC AUTO x10*3/uL 9.7 8.2   HEMOGLOBIN g/dL 12.2 13.2   HEMATOCRIT % 38.2 40.8   PLATELETS AUTO x10*3/uL 442 441   NEUTROS PCT AUTO % 85.8 80.4   LYMPHS PCT AUTO % 5.3 10.7   MONOS PCT AUTO % 5.2 4.6   EOS PCT AUTO % 2.6 2.8     Results from last 72 hours   Lab Units 25  0512 25  1043   SODIUM mmol/L 135* 135*   POTASSIUM mmol/L 4.4 3.8   CHLORIDE mmol/L 100 103   CO2 mmol/L 31 27   BUN mg/dL 12 10   CREATININE mg/dL 0.87 0.84   GLUCOSE mg/dL 91 98   CALCIUM mg/dL 8.3* 8.5*   ANION GAP mmol/L 8* 9*   EGFR mL/min/1.73m*2 75 79     Results from last 72 hours   Lab Units 25  0512 25  1043   ALK PHOS U/L 97 105   BILIRUBIN TOTAL mg/dL 0.6 0.5   PROTEIN TOTAL g/dL 6.5 6.8   ALT U/L 22 20   AST U/L 35 29   ALBUMIN g/dL 3.0* 3.3*     Estimated Creatinine Clearance: 101.4 mL/min (by C-G formula based on SCr of 0.87 mg/dL).  C-Reactive Protein   Date Value Ref Range Status   2025 13.49 (H) <1.00 mg/dL Final   2024 1.84 (H) <1.00 mg/dL Final     Sedimentation Rate   Date Value Ref Range Status   2025 108 (H) 0 - 30 mm/h Final   2024 41 (H) 0 - 30 mm/h Final     No results found for: \"HIV1X2\", \"HIVCONF\", \"XCOLHD4VX\"  Hepatitis C AB   Date Value Ref Range Status   2024 Nonreactive Nonreactive Final     Comment:     Results from patients taking biotin supplements or receiving high-dose biotin therapy should be " interpreted with caution due to possible interference with this test. Providers may contact their local laboratory for further information.       Microbiology  Susceptibility data from last 14 days.  Collected Specimen Info Organism Ampicillin Cefazolin Cefazolin (uncomplicated UTIs only) Ciprofloxacin Gentamicin Nitrofurantoin Piperacillin/Tazobactam Trimethoprim/Sulfamethoxazole   01/11/25 Urine from Clean Catch/Voided Escherichia coli  S  S  S  S  S  S  S  S       Imaging  CT abdomen pelvis w IV contrast    Result Date: 1/11/2025  Small sliding-type hiatal hernia defect. Likely mild hepatic steatosis. Mild scattered colonic diverticulosis. Signed by Aris Vanegas MD    Vascular US Lower Extremity Venous Duplex Right    Result Date: 1/11/2025  No evidence for DVT within the right lower extremity. Signed by Aris Vanegas MD

## 2025-01-22 NOTE — DISCHARGE INSTRUCTIONS
Treatment protocols recommended:  Right lower leg- Leave PATTI on clean chux per ID until 1/24 then cleanse with vashe, cover with xeroform, ABD and wrap with kerlix/ACE. Change dressing daily/prn. Elevate when resting.   Continue to off load, turning at least every 2 hours. Offload heels.   Outpatient wound care follow up on discharge.

## 2025-01-22 NOTE — CARE PLAN
Problem: Safety - Adult  Goal: Free from fall injury  Outcome: Progressing     Problem: Discharge Planning  Goal: Discharge to home or other facility with appropriate resources  Outcome: Progressing     Problem: Chronic Conditions and Co-morbidities  Goal: Patient's chronic conditions and co-morbidity symptoms are monitored and maintained or improved  Outcome: Progressing     Problem: Skin  Goal: Decreased wound size/increased tissue granulation at next dressing change  Outcome: Progressing  Goal: Participates in plan/prevention/treatment measures  Outcome: Progressing  Goal: Prevent/manage excess moisture  Outcome: Progressing  Goal: Prevent/minimize sheer/friction injuries  Outcome: Progressing  Goal: Promote/optimize nutrition  Outcome: Progressing  Goal: Promote skin healing  Outcome: Progressing     Problem: Pain  Goal: Walks with improved pain control throughout the shift  Outcome: Progressing  Goal: Performs ADL's with improved pain control throughout shift  Outcome: Progressing  Goal: Participates in PT with improved pain control throughout the shift  Outcome: Progressing  Goal: Free from opioid side effects throughout the shift  Outcome: Progressing  Goal: Free from acute confusion related to pain meds throughout the shift  Outcome: Progressing       The patient's goals for the shift include      The clinical goals for the shift include patient will remain hemodynamically stable throughout the shift.

## 2025-01-22 NOTE — PROGRESS NOTES
Mercedez Ellington [id number]   Service: Internal Medicine / Hospitalist Date of service: 1/22/2025                          Full Code                    Subjective    History Of Present Illness (HPI):  Mercedez Ellington is a 60 y.o. female with PMHx s/f HTN, DLD, T2DM (diet-controlled, last A1c 6.5%), hypothyroidism, psoriasis / psoriatic arthritis (on methotrexate), rosacea, endometrial cancer s/p hysterectomy, obesity, presenting with worsening RLE swelling, discoloration, and skin sloughing. Pt was initially seen in the ED 01/15/25 for RLE swelling and groin pain, was diagnosed with UTI and RLE cellulitis (had a negative DVT US). She was discharged on Keflex and Bactrim. Since discharge, she had been doing fairly well and was experiencing improvement in the burning pain she had initially had in the RLE since prior to start of abx. She had also been wrapping her RLE and applying a topical 3-antibiotic ointment. She reports that she did miss two days of changing the dressing and when her daughter came to assist with the dressing change, it was noted that she had worsening of the redness and more blistering / skin peeling than she had originally had. It was also more swollen compared to prior. She reports no fevers since the original day she came to the ED (Tmax 101.7 at home on 01/10/25). She has chronic chills but that is unchanged. She reports resolution of UTI symptoms. She has a cold sore on the lower portion of the R side of her mouth but denies any skin changes (outside of chronic psoriatic patches) anywhere else on her body or mouth. Her appetite has been present but slightly diminished from baseline. Denies cp/pressure, palpitations, diaphoresis, SOB, HURST, dizziness / lightheadedness, syncope or near syncope, HA, vision changes, f/n/v/d/abd pain. She denies hx VTE. She has been able to place weight on the RLE and denies issues with ROM of the knee / ankle. Notably does have history of bilateral knee replacements 2/2  OA.      ED Course (Summary - please note all labs, imaging studies, and interventions noted below have been personally reviewed and/or interpreted on day of admission):   Vitals on presentation: T96.3, /64, HR 71, RR 18, SpO2 96% RA  Labs: CBC with WBC 8.2, Hgb 13.2, platelets 441.  CMP with glucose 98, sodium 135, potassium 3.8, BUN 10, serum creatinine 0.84.  Lactate 1.1.  I ordered add on CRP (13.49), ESR (108).  Blood cultures x 2 were collected in the ED and are in process.  Imaging: none completed day of admission   Neg DVT US from 01/11/25   Interventions: Vancomycin/Ancef 2 g, admitted to medicine    1/22................1/22................No reported: Neck pain, chest pains, nausea, vomiting, fevers or chills.No increasing leg pain reported by patient.      Review of Systems:   Review of system otherwise negative if not aforementioned above in subjective.    Objective              Physical Exam     Constitutional:       Appearance: Patient appeared in no acute cardiopulmonary distress.     Comments: Patient alert and oriented to person place time and situation.  HEENT:      Head: Normocephalic and atraumatic.Trachea midline      Nose:No observed congestion or rhinorrhea.     Mouth/Throat: Mucous membranes Moist, Trachea appeared  midline.  Eyes:      Extraocular Movements: Extraocular movements intact.      Pupils: Pupils are equal, round, and reactive to light.      Comments: No scleral icterus or conjunctival injection appreciated.   Cardiovascular:      Rate and Rhythm: Normal rate and regular rhythm. No clicks rubs or gallops, normal S1 and S2.No peripheral stigmata of endocarditis appreciated.     Pulmonary:      Lungs appeared clear to auscultation, no adventitious sound appreciated.  Abdominal:      General: Abdomen soft, nontender, active bowel sounds, no involuntary guarding or rebound tenderness appreciated.     Comments: None   Musculoskeletal:       Patient appeared to have full active  range of motion for upper and lower extremities, no acute apparent joint deformity appreciated on examination.   No pitting edema or cyanosis appreciated.       Lymphadenopathy:      No appreciable palpable lymphadenopathy  Skin:     General: Skin is warm.      Coloration:  No jaundice     Findings: Right lower extremity cellulitis, findings of erythema open wounds to the lateral portion of the right leg., no appreciation of bullae    Neurological:      General: No focal sensory or motor deficits appreciated, no meningeal signs or dysmetria noted.      Cranial Nerves: Cranial nerves II to XII appearing grossly intact.     Genitals:  Deferred  Psychiatric:         The patient appears to be displaying normal mood and affect at the time of evaluation.    Labs:     Lab Results   Component Value Date    GLUCOSE 91 01/22/2025    CALCIUM 8.3 (L) 01/22/2025     (L) 01/22/2025    K 4.4 01/22/2025    CO2 31 01/22/2025     01/22/2025    BUN 12 01/22/2025    CREATININE 0.87 01/22/2025      Lab Results   Component Value Date    WBC 9.7 01/22/2025    HGB 12.2 01/22/2025    HCT 38.2 01/22/2025    MCV 95 01/22/2025     01/22/2025      [unfilled]   [unfilled]   Susceptibility data from last 90 days.  Collected Specimen Info Organism Ampicillin Cefazolin Cefazolin (uncomplicated UTIs only) Ciprofloxacin Gentamicin Nitrofurantoin Piperacillin/Tazobactam Trimethoprim/Sulfamethoxazole   01/11/25 Urine from Clean Catch/Voided Escherichia coli  S  S  S  S  S  S  S  S                          Revision History           Medical Problems       Problem List       * (Principal) Cellulitis of right lower extremity    Hypertension, essential    Hypothyroidism, acquired    Moderate episode of recurrent major depressive disorder    Primary localized osteoarthritis of right knee    Arthritis of knee    Endometrial cancer (Multi)               Above medical problems may be reflective of historical medical problems that  may have resolved and may not related to acute clinical condition/medical problems.    Clinical impression/plan:      60 y.o. female with PMHx s/f HTN, DLD, T2DM (diet-controlled, last A1c 6.5%), hypothyroidism, psoriasis / psoriatic arthritis (on methotrexate), rosacea, endometrial cancer s/p hysterectomy, obesity, presenting with worsening RLE swelling, discoloration, and skin sloughing.      Worsening RLE Cellulitis and Skin Sloughing   Failure of outpatient treatment   Immunocompromised patient  Elevated inflammatory markers (ESR, CRP)   -Patient does not meet sepsis criteria on admission   -Source: as above  -Imaging: CT RLE ordered   --DVT US negative on 01/11/25 --> will hold off on repeat for now given significant skin sloughing   -Abx allergies: PCN + Cipro (Hives and Rash to both, confirmed on admission)  -Lactate 1.1   -BP is normotensive   -Blood cultures x2 in process   -Follow fever curve, WBCs  -Continue antibiotic coverage with vanc/cefepime/flagyl until seen by ID   -(+) monitor for sxs of toxicity and/or rxn related to vancomycin; appreciate pharmacy assistance with management   -ID consultation appreciated   -Patient has intact pulses and good sensation to the RLE      Recent UTI   -Sxs have resolved   -Has completed tx with Keflex   -UA ordered in ED, pending      Hyponatremia, mild, chronic   -Na baseline in the lower 130s  -Na 135 on admit     HTN, DLD  -BP: controlled on presentation, home therapies will be continued. Close monitoring and adjust as needed.   -DLD: will be continued on home therapies   -Continued outpatient follow-up as scheduled      DM-II   -Optimize glucose control on admission with current infection   -Continue with SSI ACHS   -Accucheks, hypoglycemic protocol   -Monitor and adjust as needed       Hypothyroidism   -Continue home synthroid      Psoriasis / psoriatic arthritis   -Continue home folic acid   -Takes methotrexate on Sundays      Endometrial cancer s/p  hysterectomy  -Continue follow-up as scheduled      Morbid Obesity (BMI 50.90)   -Evidenced by BMI as stated above, complicating all aspects of care including increased utilization of hospital resources   -Continued follow-up with PCP and healthy dietary + lifestyle changes as able       Diet: Cardiac, carb controlled   DVT Prophylaxis: Lovenox, ambulate as tolerate (avoid SCDs to RLE)  Code Status: Full Code     Disposition/additional care plan/interventions: 1/22/2025      Immunocompromised host    Wound care consult    Infectious disease consult pending.    Continue current empirical antibiotics    Continue close monitoring, for the pathogen directed therapy in accordance with culture results.      Await CT of the right lower extremity rule out abscess........................agree with plan.    Continue hemodynamic support.     Patient with high complexity characteristics and appeared to be at high risk for clinical decline/deterioration  and unpredictable clinical course.     The patient was informed of differential diagnosis , work up , plan of care and possible sequelae of clinical disposition.Patient in agreement with plan of care. Further recommendations forthcoming in accordance with patient's clinical disposition and response to care.    Discharge planning:Discharge timing to be determined.    Care time: > 55 mins           Dictation performed with assistance of voice recognition device therefore transcription errors are possible.

## 2025-01-23 LAB
ANION GAP SERPL CALC-SCNC: 10 MMOL/L (ref 10–20)
BUN SERPL-MCNC: 10 MG/DL (ref 6–23)
CALCIUM SERPL-MCNC: 8.2 MG/DL (ref 8.6–10.3)
CHLORIDE SERPL-SCNC: 100 MMOL/L (ref 98–107)
CO2 SERPL-SCNC: 29 MMOL/L (ref 21–32)
CREAT SERPL-MCNC: 0.84 MG/DL (ref 0.5–1.05)
EGFRCR SERPLBLD CKD-EPI 2021: 79 ML/MIN/1.73M*2
ERYTHROCYTE [DISTWIDTH] IN BLOOD BY AUTOMATED COUNT: 15.9 % (ref 11.5–14.5)
GLUCOSE BLD MANUAL STRIP-MCNC: 100 MG/DL (ref 74–99)
GLUCOSE BLD MANUAL STRIP-MCNC: 108 MG/DL (ref 74–99)
GLUCOSE BLD MANUAL STRIP-MCNC: 108 MG/DL (ref 74–99)
GLUCOSE BLD MANUAL STRIP-MCNC: 139 MG/DL (ref 74–99)
GLUCOSE SERPL-MCNC: 113 MG/DL (ref 74–99)
HCT VFR BLD AUTO: 36.7 % (ref 36–46)
HGB BLD-MCNC: 11.6 G/DL (ref 12–16)
MCH RBC QN AUTO: 30.3 PG (ref 26–34)
MCHC RBC AUTO-ENTMCNC: 31.6 G/DL (ref 32–36)
MCV RBC AUTO: 96 FL (ref 80–100)
NRBC BLD-RTO: 0 /100 WBCS (ref 0–0)
PLATELET # BLD AUTO: 404 X10*3/UL (ref 150–450)
POTASSIUM SERPL-SCNC: 3.9 MMOL/L (ref 3.5–5.3)
RBC # BLD AUTO: 3.83 X10*6/UL (ref 4–5.2)
SODIUM SERPL-SCNC: 135 MMOL/L (ref 136–145)
WBC # BLD AUTO: 5.1 X10*3/UL (ref 4.4–11.3)

## 2025-01-23 PROCEDURE — 1100000001 HC PRIVATE ROOM DAILY

## 2025-01-23 PROCEDURE — 82947 ASSAY GLUCOSE BLOOD QUANT: CPT

## 2025-01-23 PROCEDURE — 85027 COMPLETE CBC AUTOMATED: CPT | Performed by: HOSPITALIST

## 2025-01-23 PROCEDURE — 36415 COLL VENOUS BLD VENIPUNCTURE: CPT | Performed by: HOSPITALIST

## 2025-01-23 PROCEDURE — 2500000004 HC RX 250 GENERAL PHARMACY W/ HCPCS (ALT 636 FOR OP/ED): Mod: JZ | Performed by: STUDENT IN AN ORGANIZED HEALTH CARE EDUCATION/TRAINING PROGRAM

## 2025-01-23 PROCEDURE — 80048 BASIC METABOLIC PNL TOTAL CA: CPT | Performed by: HOSPITALIST

## 2025-01-23 PROCEDURE — 2500000002 HC RX 250 W HCPCS SELF ADMINISTERED DRUGS (ALT 637 FOR MEDICARE OP, ALT 636 FOR OP/ED): Performed by: STUDENT IN AN ORGANIZED HEALTH CARE EDUCATION/TRAINING PROGRAM

## 2025-01-23 PROCEDURE — 2500000001 HC RX 250 WO HCPCS SELF ADMINISTERED DRUGS (ALT 637 FOR MEDICARE OP): Performed by: STUDENT IN AN ORGANIZED HEALTH CARE EDUCATION/TRAINING PROGRAM

## 2025-01-23 PROCEDURE — 2500000004 HC RX 250 GENERAL PHARMACY W/ HCPCS (ALT 636 FOR OP/ED): Performed by: STUDENT IN AN ORGANIZED HEALTH CARE EDUCATION/TRAINING PROGRAM

## 2025-01-23 PROCEDURE — 99232 SBSQ HOSP IP/OBS MODERATE 35: CPT | Performed by: HOSPITALIST

## 2025-01-23 RX ORDER — CEFAZOLIN SODIUM 2 G/100ML
2 INJECTION, SOLUTION INTRAVENOUS EVERY 8 HOURS
Status: DISCONTINUED | OUTPATIENT
Start: 2025-01-23 | End: 2025-01-28 | Stop reason: HOSPADM

## 2025-01-23 RX ADMIN — CEFEPIME 2 G: 1 INJECTION, SOLUTION INTRAVENOUS at 01:57

## 2025-01-23 RX ADMIN — METOPROLOL SUCCINATE 50 MG: 50 TABLET, EXTENDED RELEASE ORAL at 08:40

## 2025-01-23 RX ADMIN — PANTOPRAZOLE SODIUM 40 MG: 40 TABLET, DELAYED RELEASE ORAL at 06:19

## 2025-01-23 RX ADMIN — ACETAMINOPHEN 650 MG: 325 TABLET ORAL at 15:35

## 2025-01-23 RX ADMIN — METRONIDAZOLE 500 MG: 500 INJECTION, SOLUTION INTRAVENOUS at 04:15

## 2025-01-23 RX ADMIN — VANCOMYCIN HYDROCHLORIDE 1500 MG: 1.5 INJECTION, POWDER, LYOPHILIZED, FOR SOLUTION INTRAVENOUS at 11:34

## 2025-01-23 RX ADMIN — ONDANSETRON 4 MG: 2 INJECTION INTRAMUSCULAR; INTRAVENOUS at 08:40

## 2025-01-23 RX ADMIN — CEFAZOLIN SODIUM 2 G: 2 INJECTION, SOLUTION INTRAVENOUS at 12:32

## 2025-01-23 RX ADMIN — ENOXAPARIN SODIUM 60 MG: 60 INJECTION SUBCUTANEOUS at 18:11

## 2025-01-23 RX ADMIN — ENOXAPARIN SODIUM 60 MG: 60 INJECTION SUBCUTANEOUS at 06:19

## 2025-01-23 RX ADMIN — METRONIDAZOLE 500 MG: 500 INJECTION, SOLUTION INTRAVENOUS at 12:19

## 2025-01-23 RX ADMIN — FUROSEMIDE 20 MG: 20 TABLET ORAL at 08:40

## 2025-01-23 RX ADMIN — LEVOTHYROXINE SODIUM 125 MCG: 0.12 TABLET ORAL at 06:19

## 2025-01-23 RX ADMIN — CEFAZOLIN SODIUM 2 G: 2 INJECTION, SOLUTION INTRAVENOUS at 20:13

## 2025-01-23 RX ADMIN — CEFEPIME 2 G: 1 INJECTION, SOLUTION INTRAVENOUS at 10:21

## 2025-01-23 RX ADMIN — ESCITALOPRAM OXALATE 20 MG: 10 TABLET ORAL at 08:40

## 2025-01-23 RX ADMIN — FOLIC ACID 1 MG: 1 TABLET ORAL at 06:19

## 2025-01-23 ASSESSMENT — PAIN SCALES - GENERAL
PAINLEVEL_OUTOF10: 6
PAINLEVEL_OUTOF10: 3
PAINLEVEL_OUTOF10: 2
PAINLEVEL_OUTOF10: 3

## 2025-01-23 ASSESSMENT — COGNITIVE AND FUNCTIONAL STATUS - GENERAL
MOBILITY SCORE: 24
DAILY ACTIVITIY SCORE: 24
DAILY ACTIVITIY SCORE: 24
MOBILITY SCORE: 24

## 2025-01-23 ASSESSMENT — PAIN - FUNCTIONAL ASSESSMENT
PAIN_FUNCTIONAL_ASSESSMENT: 0-10

## 2025-01-23 ASSESSMENT — PAIN DESCRIPTION - DESCRIPTORS
DESCRIPTORS: DISCOMFORT
DESCRIPTORS: SORE
DESCRIPTORS: ACHING

## 2025-01-23 ASSESSMENT — PAIN DESCRIPTION - LOCATION: LOCATION: HEAD

## 2025-01-23 NOTE — PROGRESS NOTES
Mercedez Ellington [id number]   Service: Internal Medicine / Hospitalist Date of service: 1/22/2025                                  Full Code                           Subjective     History Of Present Illness (HPI):  Mercedez Ellington is a 60 y.o. female with PMHx s/f HTN, DLD, T2DM (diet-controlled, last A1c 6.5%), hypothyroidism, psoriasis / psoriatic arthritis (on methotrexate), rosacea, endometrial cancer s/p hysterectomy, obesity, presenting with worsening RLE swelling, discoloration, and skin sloughing. Pt was initially seen in the ED 01/15/25 for RLE swelling and groin pain, was diagnosed with UTI and RLE cellulitis (had a negative DVT US). She was discharged on Keflex and Bactrim. Since discharge, she had been doing fairly well and was experiencing improvement in the burning pain she had initially had in the RLE since prior to start of abx. She had also been wrapping her RLE and applying a topical 3-antibiotic ointment. She reports that she did miss two days of changing the dressing and when her daughter came to assist with the dressing change, it was noted that she had worsening of the redness and more blistering / skin peeling than she had originally had. It was also more swollen compared to prior. She reports no fevers since the original day she came to the ED (Tmax 101.7 at home on 01/10/25). She has chronic chills but that is unchanged. She reports resolution of UTI symptoms. She has a cold sore on the lower portion of the R side of her mouth but denies any skin changes (outside of chronic psoriatic patches) anywhere else on her body or mouth. Her appetite has been present but slightly diminished from baseline. Denies cp/pressure, palpitations, diaphoresis, SOB, HURST, dizziness / lightheadedness, syncope or near syncope, HA, vision changes, f/n/v/d/abd pain. She denies hx VTE. She has been able to place weight on the RLE and denies issues with ROM of the knee / ankle. Notably does have history of bilateral knee  replacements 2/2 OA.      ED Course (Summary - please note all labs, imaging studies, and interventions noted below have been personally reviewed and/or interpreted on day of admission):   Vitals on presentation: T96.3, /64, HR 71, RR 18, SpO2 96% RA  Labs: CBC with WBC 8.2, Hgb 13.2, platelets 441.  CMP with glucose 98, sodium 135, potassium 3.8, BUN 10, serum creatinine 0.84.  Lactate 1.1.  I ordered add on CRP (13.49), ESR (108).  Blood cultures x 2 were collected in the ED and are in process.  Imaging: none completed day of admission   Neg DVT US from 01/11/25   Interventions: Vancomycin/Ancef 2 g, admitted to medicine     1/22................No reported: Neck pain, chest pains, nausea, vomiting, fevers or chills.No increasing leg pain reported by patient.    1/23..................The patient reported overall she felt a bit better today.  No reported :palpitations, headaches, fevers, chills, nausea, vomiting or increasing right leg pain.        Review of Systems:   Review of system otherwise negative if not aforementioned above in subjective.     Objective        Physical Exam      Constitutional:       Appearance: Patient appeared in no acute cardiopulmonary distress.     Comments: Patient alert and oriented to person place time and situation.  HEENT:      Head: Normocephalic and atraumatic.Trachea midline      Nose:No observed congestion or rhinorrhea.     Mouth/Throat: Mucous membranes Moist, Trachea appeared  midline.  Eyes:      Extraocular Movements: Extraocular movements intact.      Pupils: Pupils are equal, round, and reactive to light.      Comments: No scleral icterus or conjunctival injection appreciated.   Cardiovascular:      Rate and Rhythm: Normal rate and regular rhythm. No clicks rubs or gallops, normal S1 and S2.No peripheral stigmata of endocarditis appreciated.     Pulmonary:      Lungs appeared clear to auscultation, no adventitious sound appreciated.  Abdominal:      General: Abdomen  soft, nontender, active bowel sounds, no involuntary guarding or rebound tenderness appreciated.     Comments: None   Musculoskeletal:       Patient appeared to have full active range of motion for upper and lower extremities, no acute apparent joint deformity appreciated on examination.   No pitting edema or cyanosis appreciated.       Lymphadenopathy:      No appreciable palpable lymphadenopathy  Skin:     General: Skin is warm.      Coloration:  No jaundice     Findings: Right lower extremity cellulitis, findings of erythema open wounds to the lateral portion of the right leg., no appreciation of bullae     Neurological:      General: No focal sensory or motor deficits appreciated, no meningeal signs or dysmetria noted.      Cranial Nerves: Cranial nerves II to XII appearing grossly intact.     Genitals:  Deferred  Psychiatric:         The patient appears to be displaying normal mood and affect at the time of evaluation.     Labs:           Lab Results   Component Value Date     GLUCOSE 91 01/22/2025     CALCIUM 8.3 (L) 01/22/2025      (L) 01/22/2025     K 4.4 01/22/2025     CO2 31 01/22/2025      01/22/2025     BUN 12 01/22/2025     CREATININE 0.87 01/22/2025            Lab Results   Component Value Date     WBC 9.7 01/22/2025     HGB 12.2 01/22/2025     HCT 38.2 01/22/2025     MCV 95 01/22/2025      01/22/2025      [unfilled]   [unfilled]   Susceptibility data from last 90 days.  Collected Specimen Info Organism Ampicillin Cefazolin Cefazolin (uncomplicated UTIs only) Ciprofloxacin Gentamicin Nitrofurantoin Piperacillin/Tazobactam Trimethoprim/Sulfamethoxazole   01/11/25 Urine from Clean Catch/Voided Escherichia coli  S  S  S  S  S  S  S  S                                            Revision History             Medical Problems         Problem List         * (Principal) Cellulitis of right lower extremity     Hypertension, essential     Hypothyroidism, acquired     Moderate  episode of recurrent major depressive disorder     Primary localized osteoarthritis of right knee     Arthritis of knee     Endometrial cancer (Multi)                  Above medical problems may be reflective of historical medical problems that may have resolved and may not related to acute clinical condition/medical problems.     Clinical impression/plan:        60 y.o. female with PMHx s/f HTN, DLD, T2DM (diet-controlled, last A1c 6.5%), hypothyroidism, psoriasis / psoriatic arthritis (on methotrexate), rosacea, endometrial cancer s/p hysterectomy, obesity, presenting with worsening RLE swelling, discoloration, and skin sloughing.      Worsening RLE Cellulitis and Skin Sloughing   Failure of outpatient treatment   Immunocompromised patient  Elevated inflammatory markers (ESR, CRP)   -Patient does not meet sepsis criteria on admission   -Source: as above  -Imaging: CT RLE ordered   --DVT US negative on 01/11/25 --> will hold off on repeat for now given significant skin sloughing   -Abx allergies: PCN + Cipro (Hives and Rash to both, confirmed on admission)  -Lactate 1.1   -BP is normotensive   -Blood cultures x2 in process   -Follow fever curve, WBCs  -Continue antibiotic coverage with vanc/cefepime/flagyl until seen by ID   -(+) monitor for sxs of toxicity and/or rxn related to vancomycin; appreciate pharmacy assistance with management   -ID consultation appreciated   -Patient has intact pulses and good sensation to the RLE      Recent UTI   -Sxs have resolved   -Has completed tx with Keflex   -UA ordered in ED, pending      Hyponatremia, mild, chronic   -Na baseline in the lower 130s  -Na 135 on admit     HTN, DLD  -BP: controlled on presentation, home therapies will be continued. Close monitoring and adjust as needed.   -DLD: will be continued on home therapies   -Continued outpatient follow-up as scheduled      DM-II   -Optimize glucose control on admission with current infection   -Continue with SSI ACHS    -Accucheks, hypoglycemic protocol   -Monitor and adjust as needed       Hypothyroidism   -Continue home synthroid      Psoriasis / psoriatic arthritis   -Continue home folic acid   -Takes methotrexate on Sundays      Endometrial cancer s/p hysterectomy  -Continue follow-up as scheduled      Morbid Obesity (BMI 50.90)   -Evidenced by BMI as stated above, complicating all aspects of care including increased utilization of hospital resources   -Continued follow-up with PCP and healthy dietary + lifestyle changes as able       Diet: Cardiac, carb controlled   DVT Prophylaxis: Lovenox, ambulate as tolerate (avoid SCDs to RLE)  Code Status: Full Code      Disposition/additional care plan/interventions: 1/22/2025        Immunocompromised host     Wound care consult     Infectious disease consult pending.     Continue current empirical antibiotics     Continue close monitoring, for the pathogen directed therapy in accordance with culture results.        Await CT of the right lower extremity rule out abscess........................agree with plan.     Continue hemodynamic support.      Patient with high complexity characteristics and appeared to be at high risk for clinical decline/deterioration  and unpredictable clinical course.     Disposition/additional care plan/interventions: 1/23/2025    Lower extremity CT scan of tib-fib without IV contrast reviewed: Nonspecific right lower leg soft tissue swelling in the superficial   soft tissues. There is likely a component of chronic venous stasis as   there are numerous tiny soft tissue calcifications. A superimposed   component of cellulitis could be present as well.       No evidence of abscess or osteomyelitis.     Antibiotic stewardship as per ID/de-escalation as per ID    Continue to monitor clinical response to antibiotic therapy.    Patient appeared nontoxic and appeared hemodynamically stable will continue close monitoring of severe right lower extremity  cellulitis.    Notable findings on right lower extremity examination: Decreased warmth appreciated on examination, slight improvement in erythema noted mid anterior shin.     Pansensitive E. coli noted in urine: 20,000 - 80,000 CFU/mL Escherichia coli covered by current antimicrobial therapy      The patient was informed of differential diagnosis , work up , plan of care and possible sequelae of clinical disposition.Patient in agreement with plan of care. Further recommendations forthcoming in accordance with patient's clinical disposition and response to care.     Discharge planning:Discharge timing to be determined.     Care time: > 35 mins              Dictation performed with assistance of voice recognition device therefore transcription errors are possible.

## 2025-01-23 NOTE — PROGRESS NOTES
Vancomycin Dosing by Pharmacy- Cessation of Therapy    Consult to pharmacy for vancomycin dosing has been discontinued by the prescriber, pharmacy will sign off at this time.    Please call pharmacy if there are further questions or re-enter a consult if vancomycin is resumed.     Jovi Jefferson formerly [facility]

## 2025-01-23 NOTE — CARE PLAN
Problem: Safety - Adult  Goal: Free from fall injury  Outcome: Progressing     Problem: Discharge Planning  Goal: Discharge to home or other facility with appropriate resources  Outcome: Progressing     Problem: Chronic Conditions and Co-morbidities  Goal: Patient's chronic conditions and co-morbidity symptoms are monitored and maintained or improved  Outcome: Progressing     Problem: Skin  Goal: Decreased wound size/increased tissue granulation at next dressing change  Outcome: Progressing  Flowsheets (Taken 1/23/2025 0008)  Decreased wound size/increased tissue granulation at next dressing change:   Promote sleep for wound healing   Protective dressings over bony prominences  Goal: Participates in plan/prevention/treatment measures  Outcome: Progressing  Flowsheets (Taken 1/23/2025 0008)  Participates in plan/prevention/treatment measures:   Discuss with provider PT/OT consult   Elevate heels   Increase activity/out of bed for meals  Goal: Prevent/manage excess moisture  Outcome: Progressing  Flowsheets (Taken 1/23/2025 0008)  Prevent/manage excess moisture:   Cleanse incontinence/protect with barrier cream   Moisturize dry skin   Follow provider orders for dressing changes   Monitor for/manage infection if present  Goal: Prevent/minimize sheer/friction injuries  Outcome: Progressing  Flowsheets (Taken 1/23/2025 0008)  Prevent/minimize sheer/friction injuries: Increase activity/out of bed for meals  Goal: Promote/optimize nutrition  Outcome: Progressing  Flowsheets (Taken 1/23/2025 0008)  Promote/optimize nutrition:   Monitor/record intake including meals   Consume > 50% meals/supplements  Goal: Promote skin healing  Outcome: Progressing  Flowsheets (Taken 1/23/2025 0008)  Promote skin healing:   Protective dressings over bony prominences   Assess skin/pad under line(s)/device(s)     Problem: Pain  Goal: Walks with improved pain control throughout the shift  Outcome: Progressing  Goal: Performs ADL's with  improved pain control throughout shift  Outcome: Progressing  Goal: Participates in PT with improved pain control throughout the shift  Outcome: Progressing  Goal: Free from opioid side effects throughout the shift  Outcome: Progressing  Goal: Free from acute confusion related to pain meds throughout the shift  Outcome: Progressing

## 2025-01-23 NOTE — PROGRESS NOTES
Mercedez Ellington is a 60 y.o. female on day 2 of admission presenting with Cellulitis of right lower extremity.      Assessment/Plan:     #Right lower extremity cellulitis  #Hx of psoriasis on MTX hypothyroidism  Patient only got Keflex 500 mg every 12 hours which likely led to the antibiotic failure. Clinically, does not look like necrotizing fasciitis.  CT scan not concerning for nec fac     Hypothyroidism  Diet-controlled diabetes  Endometrial cancer s/p hysterectomy  Obesity      Recommendations:     -DC vancomycin, cefepime and flagyl  -Start cefazolin 2 g with our  -Monitor blood cultures  -Monitor superficial wound/fluid cultures  -Will continue to follow        Mir Marie MD  Date of service: 1/23/2025  Time of service: 4:06 PM      Subjective   Interval History: No acute events overnight.  Patient denies any new complaint.        Review of Systems  Denies: fever, chills, nausea, vomiting, diarrhea, dysuria    Objective   Range of Vitals (last 24 hours)  Heart Rate:  [58-69]   Temp:  [36.1 °C (97 °F)-37.1 °C (98.8 °F)]   Resp:  [16-19]   BP: (105-151)/(64-78)   SpO2:  [91 %-96 %]  Daily Weight  01/21/25 : 147 kg (325 lb)   Body mass index is 50.9 kg/m².    General: alert, oriented, NAD  HEENT: No conjunctival pallor, no scleral icterus  Neck: No LAD, No JVD  Abdomen: soft, non tender, non distended, BS+  Neuro: AAO x 3, PERRL, CN grossly intact  Extremities: R edema, redness, swelling, warmLE, ruptured blisters with yellow/brown drainage  Skin: As above  MSK: No joint inflammation            Antibiotics  ceFAZolin - 2 gram/100 mL      Relevant Results  Labs  Results from last 72 hours   Lab Units 01/23/25  0425 01/22/25  0512 01/21/25  1043   WBC AUTO x10*3/uL 5.1 9.7 8.2   HEMOGLOBIN g/dL 11.6* 12.2 13.2   HEMATOCRIT % 36.7 38.2 40.8   PLATELETS AUTO x10*3/uL 404 442 441   NEUTROS PCT AUTO %  --  85.8 80.4   LYMPHS PCT AUTO %  --  5.3 10.7   MONOS PCT AUTO %  --  5.2 4.6   EOS PCT AUTO %  --  2.6 2.8      Results from last 72 hours   Lab Units 01/23/25  0425 01/22/25  0512 01/21/25  1043   SODIUM mmol/L 135* 135* 135*   POTASSIUM mmol/L 3.9 4.4 3.8   CHLORIDE mmol/L 100 100 103   CO2 mmol/L 29 31 27   BUN mg/dL 10 12 10   CREATININE mg/dL 0.84 0.87 0.84   GLUCOSE mg/dL 113* 91 98   CALCIUM mg/dL 8.2* 8.3* 8.5*   ANION GAP mmol/L 10 8* 9*   EGFR mL/min/1.73m*2 79 75 79     Results from last 72 hours   Lab Units 01/22/25  0512 01/21/25  1043   ALK PHOS U/L 97 105   BILIRUBIN TOTAL mg/dL 0.6 0.5   PROTEIN TOTAL g/dL 6.5 6.8   ALT U/L 22 20   AST U/L 35 29   ALBUMIN g/dL 3.0* 3.3*     Estimated Creatinine Clearance: 105 mL/min (by C-G formula based on SCr of 0.84 mg/dL).  C-Reactive Protein   Date Value Ref Range Status   01/21/2025 13.49 (H) <1.00 mg/dL Final   07/26/2024 1.84 (H) <1.00 mg/dL Final       Microbiology  Susceptibility data from last 14 days.  Collected Specimen Info Organism Ampicillin Cefazolin Cefazolin (uncomplicated UTIs only) Ciprofloxacin Gentamicin Nitrofurantoin Piperacillin/Tazobactam Trimethoprim/Sulfamethoxazole   01/11/25 Urine from Clean Catch/Voided Escherichia coli  S  S  S  S  S  S  S  S       Imaging    CT tibia fibula right wo IV contrast    Result Date: 1/23/2025      Nonspecific right lower leg soft tissue swelling in the superficial soft tissues. There is likely a component of chronic venous stasis as there are numerous tiny soft tissue calcifications. A superimposed component of cellulitis could be present as well.   No evidence of abscess or osteomyelitis.   MACRO: None   Signed by: Sonny Dempsey 1/23/2025 11:25 AM Dictation workstation:   [id number]    CT abdomen pelvis w IV contrast    Result Date: 1/11/2025  Small sliding-type hiatal hernia defect. Likely mild hepatic steatosis. Mild scattered colonic diverticulosis. Signed by Aris Vanegas MD    Vascular US Lower Extremity Venous Duplex Right    Result Date: 1/11/2025  No evidence for DVT within the right lower extremity.  Signed by Aris Vanegas MD

## 2025-01-23 NOTE — CARE PLAN
The patient's goals for the shift include        Problem: Safety - Adult  Goal: Free from fall injury  Outcome: Progressing     Problem: Discharge Planning  Goal: Discharge to home or other facility with appropriate resources  Outcome: Progressing     Problem: Chronic Conditions and Co-morbidities  Goal: Patient's chronic conditions and co-morbidity symptoms are monitored and maintained or improved  Outcome: Progressing     Problem: Skin  Goal: Decreased wound size/increased tissue granulation at next dressing change  Outcome: Progressing  Flowsheets (Taken 1/23/2025 0736)  Decreased wound size/increased tissue granulation at next dressing change: Promote sleep for wound healing  Goal: Participates in plan/prevention/treatment measures  Outcome: Progressing  Flowsheets (Taken 1/23/2025 0736)  Participates in plan/prevention/treatment measures:   Discuss with provider PT/OT consult   Elevate heels   Increase activity/out of bed for meals  Goal: Prevent/manage excess moisture  Outcome: Progressing  Flowsheets (Taken 1/23/2025 0736)  Prevent/manage excess moisture:   Monitor for/manage infection if present   Follow provider orders for dressing changes   Cleanse incontinence/protect with barrier cream   Moisturize dry skin  Goal: Prevent/minimize sheer/friction injuries  Outcome: Progressing  Flowsheets (Taken 1/23/2025 0736)  Prevent/minimize sheer/friction injuries:   Complete micro-shifts as needed if patient unable. Adjust patient position to relieve pressure points, not a full turn   HOB 30 degrees or less   Increase activity/out of bed for meals  Goal: Promote/optimize nutrition  Outcome: Progressing  Flowsheets (Taken 1/23/2025 0736)  Promote/optimize nutrition: Monitor/record intake including meals  Goal: Promote skin healing  Outcome: Progressing  Flowsheets (Taken 1/23/2025 0736)  Promote skin healing:   Assess skin/pad under line(s)/device(s)   Ensure correct size (line/device) and apply per   instructions   Rotate device position/do not position patient on device     Problem: Pain  Goal: Walks with improved pain control throughout the shift  Outcome: Progressing  Goal: Performs ADL's with improved pain control throughout shift  Outcome: Progressing  Goal: Participates in PT with improved pain control throughout the shift  Outcome: Progressing  Goal: Free from opioid side effects throughout the shift  Outcome: Progressing  Goal: Free from acute confusion related to pain meds throughout the shift  Outcome: Progressing     Problem: Fall/Injury  Goal: Not fall by end of shift  Outcome: Progressing  Goal: Be free from injury by end of the shift  Outcome: Progressing  Goal: Verbalize understanding of personal risk factors for fall in the hospital  Outcome: Progressing  Goal: Verbalize understanding of risk factor reduction measures to prevent injury from fall in the home  Outcome: Progressing  Goal: Use assistive devices by end of the shift  Outcome: Progressing  Goal: Pace activities to prevent fatigue by end of the shift  Outcome: Progressing     Problem: Diabetes  Goal: Maintain glucose levels >70mg/dl to <250mg/dl throughout shift  Outcome: Progressing  Goal: No changes in neurological exam by end of shift  Outcome: Progressing  Goal: Increase self care and/or family involovement by end of shift  Outcome: Progressing

## 2025-01-24 LAB
ANION GAP SERPL CALC-SCNC: 9 MMOL/L (ref 10–20)
BUN SERPL-MCNC: 13 MG/DL (ref 6–23)
CALCIUM SERPL-MCNC: 8.3 MG/DL (ref 8.6–10.3)
CHLORIDE SERPL-SCNC: 102 MMOL/L (ref 98–107)
CO2 SERPL-SCNC: 30 MMOL/L (ref 21–32)
CREAT SERPL-MCNC: 0.82 MG/DL (ref 0.5–1.05)
EGFRCR SERPLBLD CKD-EPI 2021: 81 ML/MIN/1.73M*2
ERYTHROCYTE [DISTWIDTH] IN BLOOD BY AUTOMATED COUNT: 16.1 % (ref 11.5–14.5)
GLUCOSE BLD MANUAL STRIP-MCNC: 116 MG/DL (ref 74–99)
GLUCOSE BLD MANUAL STRIP-MCNC: 127 MG/DL (ref 74–99)
GLUCOSE BLD MANUAL STRIP-MCNC: 129 MG/DL (ref 74–99)
GLUCOSE BLD MANUAL STRIP-MCNC: 98 MG/DL (ref 74–99)
GLUCOSE SERPL-MCNC: 93 MG/DL (ref 74–99)
HCT VFR BLD AUTO: 36.9 % (ref 36–46)
HGB BLD-MCNC: 11.7 G/DL (ref 12–16)
MCH RBC QN AUTO: 30.1 PG (ref 26–34)
MCHC RBC AUTO-ENTMCNC: 31.7 G/DL (ref 32–36)
MCV RBC AUTO: 95 FL (ref 80–100)
NRBC BLD-RTO: 0 /100 WBCS (ref 0–0)
PLATELET # BLD AUTO: 422 X10*3/UL (ref 150–450)
POTASSIUM SERPL-SCNC: 3.9 MMOL/L (ref 3.5–5.3)
RBC # BLD AUTO: 3.89 X10*6/UL (ref 4–5.2)
SODIUM SERPL-SCNC: 137 MMOL/L (ref 136–145)
WBC # BLD AUTO: 4.8 X10*3/UL (ref 4.4–11.3)

## 2025-01-24 PROCEDURE — 2500000004 HC RX 250 GENERAL PHARMACY W/ HCPCS (ALT 636 FOR OP/ED): Mod: JZ | Performed by: STUDENT IN AN ORGANIZED HEALTH CARE EDUCATION/TRAINING PROGRAM

## 2025-01-24 PROCEDURE — 85027 COMPLETE CBC AUTOMATED: CPT | Performed by: HOSPITALIST

## 2025-01-24 PROCEDURE — 36415 COLL VENOUS BLD VENIPUNCTURE: CPT | Performed by: HOSPITALIST

## 2025-01-24 PROCEDURE — 2500000004 HC RX 250 GENERAL PHARMACY W/ HCPCS (ALT 636 FOR OP/ED): Performed by: STUDENT IN AN ORGANIZED HEALTH CARE EDUCATION/TRAINING PROGRAM

## 2025-01-24 PROCEDURE — 82947 ASSAY GLUCOSE BLOOD QUANT: CPT

## 2025-01-24 PROCEDURE — 2500000001 HC RX 250 WO HCPCS SELF ADMINISTERED DRUGS (ALT 637 FOR MEDICARE OP): Performed by: STUDENT IN AN ORGANIZED HEALTH CARE EDUCATION/TRAINING PROGRAM

## 2025-01-24 PROCEDURE — 99232 SBSQ HOSP IP/OBS MODERATE 35: CPT | Performed by: HOSPITALIST

## 2025-01-24 PROCEDURE — 80048 BASIC METABOLIC PNL TOTAL CA: CPT | Performed by: HOSPITALIST

## 2025-01-24 PROCEDURE — 2500000002 HC RX 250 W HCPCS SELF ADMINISTERED DRUGS (ALT 637 FOR MEDICARE OP, ALT 636 FOR OP/ED): Performed by: STUDENT IN AN ORGANIZED HEALTH CARE EDUCATION/TRAINING PROGRAM

## 2025-01-24 PROCEDURE — 1100000001 HC PRIVATE ROOM DAILY

## 2025-01-24 RX ORDER — LINEZOLID 600 MG/1
600 TABLET, FILM COATED ORAL EVERY 12 HOURS SCHEDULED
Status: DISCONTINUED | OUTPATIENT
Start: 2025-01-24 | End: 2025-01-28 | Stop reason: HOSPADM

## 2025-01-24 RX ADMIN — FUROSEMIDE 20 MG: 20 TABLET ORAL at 07:59

## 2025-01-24 RX ADMIN — ONDANSETRON 4 MG: 2 INJECTION INTRAMUSCULAR; INTRAVENOUS at 08:07

## 2025-01-24 RX ADMIN — ENOXAPARIN SODIUM 60 MG: 60 INJECTION SUBCUTANEOUS at 18:15

## 2025-01-24 RX ADMIN — ACETAMINOPHEN 650 MG: 325 TABLET ORAL at 21:31

## 2025-01-24 RX ADMIN — ENOXAPARIN SODIUM 60 MG: 60 INJECTION SUBCUTANEOUS at 06:01

## 2025-01-24 RX ADMIN — CEFAZOLIN SODIUM 2 G: 2 INJECTION, SOLUTION INTRAVENOUS at 21:31

## 2025-01-24 RX ADMIN — LINEZOLID 600 MG: 600 TABLET, FILM COATED ORAL at 21:31

## 2025-01-24 RX ADMIN — LEVOTHYROXINE SODIUM 125 MCG: 0.12 TABLET ORAL at 06:01

## 2025-01-24 RX ADMIN — ESCITALOPRAM OXALATE 20 MG: 10 TABLET ORAL at 07:59

## 2025-01-24 RX ADMIN — LINEZOLID 600 MG: 600 TABLET, FILM COATED ORAL at 10:41

## 2025-01-24 RX ADMIN — PANTOPRAZOLE SODIUM 40 MG: 40 TABLET, DELAYED RELEASE ORAL at 06:01

## 2025-01-24 RX ADMIN — CEFAZOLIN SODIUM 2 G: 2 INJECTION, SOLUTION INTRAVENOUS at 04:18

## 2025-01-24 RX ADMIN — CEFAZOLIN SODIUM 2 G: 2 INJECTION, SOLUTION INTRAVENOUS at 12:45

## 2025-01-24 RX ADMIN — ACETAMINOPHEN 650 MG: 325 TABLET ORAL at 08:58

## 2025-01-24 RX ADMIN — FOLIC ACID 1 MG: 1 TABLET ORAL at 06:00

## 2025-01-24 RX ADMIN — METOPROLOL SUCCINATE 50 MG: 50 TABLET, EXTENDED RELEASE ORAL at 07:59

## 2025-01-24 ASSESSMENT — PAIN SCALES - GENERAL
PAINLEVEL_OUTOF10: 1
PAINLEVEL_OUTOF10: 3
PAINLEVEL_OUTOF10: 1
PAINLEVEL_OUTOF10: 5 - MODERATE PAIN

## 2025-01-24 ASSESSMENT — COGNITIVE AND FUNCTIONAL STATUS - GENERAL
MOBILITY SCORE: 24
DAILY ACTIVITIY SCORE: 24

## 2025-01-24 ASSESSMENT — PAIN DESCRIPTION - LOCATION
LOCATION: HEAD
LOCATION: HEAD

## 2025-01-24 ASSESSMENT — PAIN - FUNCTIONAL ASSESSMENT
PAIN_FUNCTIONAL_ASSESSMENT: 0-10
PAIN_FUNCTIONAL_ASSESSMENT: 0-10

## 2025-01-24 ASSESSMENT — PAIN DESCRIPTION - DESCRIPTORS: DESCRIPTORS: ACHING

## 2025-01-24 NOTE — CARE PLAN
Problem: Safety - Adult  Goal: Free from fall injury  Outcome: Progressing     Problem: Discharge Planning  Goal: Discharge to home or other facility with appropriate resources  Outcome: Progressing     Problem: Chronic Conditions and Co-morbidities  Goal: Patient's chronic conditions and co-morbidity symptoms are monitored and maintained or improved  Outcome: Progressing     Problem: Skin  Goal: Decreased wound size/increased tissue granulation at next dressing change  Outcome: Progressing  Flowsheets (Taken 1/23/2025 2022)  Decreased wound size/increased tissue granulation at next dressing change: Promote sleep for wound healing  Goal: Participates in plan/prevention/treatment measures  Outcome: Progressing  Flowsheets (Taken 1/23/2025 2022)  Participates in plan/prevention/treatment measures:   Discuss with provider PT/OT consult   Increase activity/out of bed for meals  Goal: Prevent/manage excess moisture  Outcome: Progressing  Flowsheets (Taken 1/23/2025 2022)  Prevent/manage excess moisture:   Cleanse incontinence/protect with barrier cream   Moisturize dry skin   Monitor for/manage infection if present  Goal: Prevent/minimize sheer/friction injuries  Outcome: Progressing  Flowsheets (Taken 1/23/2025 2022)  Prevent/minimize sheer/friction injuries: Increase activity/out of bed for meals  Goal: Promote/optimize nutrition  Outcome: Progressing  Flowsheets (Taken 1/23/2025 2022)  Promote/optimize nutrition:   Monitor/record intake including meals   Assist with feeding   Consume > 50% meals/supplements  Goal: Promote skin healing  Outcome: Progressing  Flowsheets (Taken 1/23/2025 2022)  Promote skin healing:   Protective dressings over bony prominences   Assess skin/pad under line(s)/device(s)     Problem: Pain  Goal: Walks with improved pain control throughout the shift  Outcome: Progressing  Goal: Performs ADL's with improved pain control throughout shift  Outcome: Progressing  Goal: Participates in PT with  improved pain control throughout the shift  Outcome: Progressing  Goal: Free from opioid side effects throughout the shift  Outcome: Progressing  Goal: Free from acute confusion related to pain meds throughout the shift  Outcome: Progressing     Problem: Fall/Injury  Goal: Not fall by end of shift  Outcome: Progressing  Goal: Be free from injury by end of the shift  Outcome: Progressing  Goal: Verbalize understanding of personal risk factors for fall in the hospital  Outcome: Progressing  Goal: Verbalize understanding of risk factor reduction measures to prevent injury from fall in the home  Outcome: Progressing  Goal: Use assistive devices by end of the shift  Outcome: Progressing  Goal: Pace activities to prevent fatigue by end of the shift  Outcome: Progressing     Problem: Diabetes  Goal: Maintain glucose levels >70mg/dl to <250mg/dl throughout shift  Outcome: Progressing  Goal: No changes in neurological exam by end of shift  Outcome: Progressing  Goal: Increase self care and/or family involovement by end of shift  Outcome: Progressing

## 2025-01-24 NOTE — CARE PLAN
The patient's goals for the shift include        Problem: Safety - Adult  Goal: Free from fall injury  Outcome: Progressing     Problem: Discharge Planning  Goal: Discharge to home or other facility with appropriate resources  Outcome: Progressing     Problem: Chronic Conditions and Co-morbidities  Goal: Patient's chronic conditions and co-morbidity symptoms are monitored and maintained or improved  Outcome: Progressing     Problem: Skin  Goal: Decreased wound size/increased tissue granulation at next dressing change  Outcome: Progressing  Flowsheets (Taken 1/24/2025 0712)  Decreased wound size/increased tissue granulation at next dressing change: Promote sleep for wound healing  Goal: Participates in plan/prevention/treatment measures  Outcome: Progressing  Flowsheets (Taken 1/24/2025 0712)  Participates in plan/prevention/treatment measures:   Discuss with provider PT/OT consult   Increase activity/out of bed for meals   Elevate heels  Goal: Prevent/manage excess moisture  Outcome: Progressing  Flowsheets (Taken 1/24/2025 0712)  Prevent/manage excess moisture:   Monitor for/manage infection if present   Follow provider orders for dressing changes   Cleanse incontinence/protect with barrier cream   Moisturize dry skin  Goal: Prevent/minimize sheer/friction injuries  Outcome: Progressing  Flowsheets (Taken 1/24/2025 0712)  Prevent/minimize sheer/friction injuries:   Complete micro-shifts as needed if patient unable. Adjust patient position to relieve pressure points, not a full turn   HOB 30 degrees or less   Increase activity/out of bed for meals   Turn/reposition every 2 hours/use positioning/transfer devices  Goal: Promote/optimize nutrition  Outcome: Progressing  Flowsheets (Taken 1/24/2025 0712)  Promote/optimize nutrition: Monitor/record intake including meals  Goal: Promote skin healing  Outcome: Progressing  Flowsheets (Taken 1/24/2025 0712)  Promote skin healing:   Assess skin/pad under line(s)/device(s)    Ensure correct size (line/device) and apply per  instructions   Protective dressings over bony prominences   Rotate device position/do not position patient on device     Problem: Pain  Goal: Walks with improved pain control throughout the shift  Outcome: Progressing  Goal: Performs ADL's with improved pain control throughout shift  Outcome: Progressing  Goal: Participates in PT with improved pain control throughout the shift  Outcome: Progressing  Goal: Free from opioid side effects throughout the shift  Outcome: Progressing  Goal: Free from acute confusion related to pain meds throughout the shift  Outcome: Progressing     Problem: Fall/Injury  Goal: Not fall by end of shift  Outcome: Progressing  Goal: Be free from injury by end of the shift  Outcome: Progressing  Goal: Verbalize understanding of personal risk factors for fall in the hospital  Outcome: Progressing  Goal: Verbalize understanding of risk factor reduction measures to prevent injury from fall in the home  Outcome: Progressing  Goal: Use assistive devices by end of the shift  Outcome: Progressing  Goal: Pace activities to prevent fatigue by end of the shift  Outcome: Progressing     Problem: Diabetes  Goal: Maintain glucose levels >70mg/dl to <250mg/dl throughout shift  Outcome: Progressing  Goal: No changes in neurological exam by end of shift  Outcome: Progressing  Goal: Increase self care and/or family involovement by end of shift  Outcome: Progressing

## 2025-01-24 NOTE — PROGRESS NOTES
Mercedez Ellington [id number]   Service: Internal Medicine / Hospitalist Date of service: 1/24/2025                                  Full Code                           Subjective     History Of Present Illness (HPI):  Mercedez Ellington is a 60 y.o. female with PMHx s/f HTN, DLD, T2DM (diet-controlled, last A1c 6.5%), hypothyroidism, psoriasis / psoriatic arthritis (on methotrexate), rosacea, endometrial cancer s/p hysterectomy, obesity, presenting with worsening RLE swelling, discoloration, and skin sloughing. Pt was initially seen in the ED 01/15/25 for RLE swelling and groin pain, was diagnosed with UTI and RLE cellulitis (had a negative DVT US). She was discharged on Keflex and Bactrim. Since discharge, she had been doing fairly well and was experiencing improvement in the burning pain she had initially had in the RLE since prior to start of abx. She had also been wrapping her RLE and applying a topical 3-antibiotic ointment. She reports that she did miss two days of changing the dressing and when her daughter came to assist with the dressing change, it was noted that she had worsening of the redness and more blistering / skin peeling than she had originally had. It was also more swollen compared to prior. She reports no fevers since the original day she came to the ED (Tmax 101.7 at home on 01/10/25). She has chronic chills but that is unchanged. She reports resolution of UTI symptoms. She has a cold sore on the lower portion of the R side of her mouth but denies any skin changes (outside of chronic psoriatic patches) anywhere else on her body or mouth. Her appetite has been present but slightly diminished from baseline. Denies cp/pressure, palpitations, diaphoresis, SOB, HURST, dizziness / lightheadedness, syncope or near syncope, HA, vision changes, f/n/v/d/abd pain. She denies hx VTE. She has been able to place weight on the RLE and denies issues with ROM of the knee / ankle. Notably does have history of bilateral knee  replacements 2/2 OA.      ED Course (Summary - please note all labs, imaging studies, and interventions noted below have been personally reviewed and/or interpreted on day of admission):   Vitals on presentation: T96.3, /64, HR 71, RR 18, SpO2 96% RA  Labs: CBC with WBC 8.2, Hgb 13.2, platelets 441.  CMP with glucose 98, sodium 135, potassium 3.8, BUN 10, serum creatinine 0.84.  Lactate 1.1.  I ordered add on CRP (13.49), ESR (108).  Blood cultures x 2 were collected in the ED and are in process.  Imaging: none completed day of admission   Neg DVT US from 01/11/25   Interventions: Vancomycin/Ancef 2 g, admitted to medicine     1/22................No reported: Neck pain, chest pains, nausea, vomiting, fevers or chills.No increasing leg pain reported by patient.     1/23..................The patient reported overall she felt a bit better today.  No reported :palpitations, headaches, fevers, chills, nausea, vomiting or increasing right leg pain.    1/24...................Patient seen and examined with her son at the bedside.  Patient endorsed nausea no other complaints voiced by patient today.  No reported abdominal pain, emesis, chest pains, fevers, chills, nausea or vomiting        Review of Systems:   Review of system otherwise negative if not aforementioned above in subjective.     Objective        Physical Exam      Constitutional:       Appearance: Patient appeared in no acute cardiopulmonary distress.     Comments: Patient alert and oriented to person place time and situation.  HEENT:      Head: Normocephalic and atraumatic.Trachea midline      Nose:No observed congestion or rhinorrhea.     Mouth/Throat: Mucous membranes Moist, Trachea appeared  midline.  Eyes:      Extraocular Movements: Extraocular movements intact.      Pupils: Pupils are equal, round, and reactive to light.      Comments: No scleral icterus or conjunctival injection appreciated.   Cardiovascular:      Rate and Rhythm: Normal rate and  regular rhythm. No clicks rubs or gallops, normal S1 and S2.No peripheral stigmata of endocarditis appreciated.     Pulmonary:      Lungs appeared clear to auscultation, no adventitious sound appreciated.  Abdominal:      General: Abdomen soft, nontender, active bowel sounds, no involuntary guarding or rebound tenderness appreciated.     Comments: None   Musculoskeletal:       Patient appeared to have full active range of motion for upper and lower extremities, no acute apparent joint deformity appreciated on examination.   No pitting edema or cyanosis appreciated.       Lymphadenopathy:      No appreciable palpable lymphadenopathy  Skin:     General: Skin is warm.      Coloration:  No jaundice     Findings: Right lower extremity cellulitis, findings of erythema open wounds to the lateral portion of the right leg., no appreciation of bullae.  Slight interval change/improvement noted on examination today with decreasing erythema and warmth.     Neurological:      General: No focal sensory or motor deficits appreciated, no meningeal signs or dysmetria noted.      Cranial Nerves: Cranial nerves II to XII appearing grossly intact.     Genitals:  Deferred  Psychiatric:         The patient appears to be displaying normal mood and affect at the time of evaluation.     Labs:        Lab Results   Component Value Date    GLUCOSE 93 01/24/2025    CALCIUM 8.3 (L) 01/24/2025     01/24/2025    K 3.9 01/24/2025    CO2 30 01/24/2025     01/24/2025    BUN 13 01/24/2025    CREATININE 0.82 01/24/2025      Lab Results   Component Value Date    WBC 4.8 01/24/2025    HGB 11.7 (L) 01/24/2025    HCT 36.9 01/24/2025    MCV 95 01/24/2025     01/24/2025      [unfilled]   [unfilled]   Susceptibility data from last 90 days.  Collected Specimen Info Organism Ampicillin Cefazolin Cefazolin (uncomplicated UTIs only) Ciprofloxacin Gentamicin Nitrofurantoin Piperacillin/Tazobactam Trimethoprim/Sulfamethoxazole    01/11/25 Urine from Clean Catch/Voided Escherichia coli  S  S  S  S  S  S  S  S                                                   Revision History             Medical Problems         Problem List         * (Principal) Cellulitis of right lower extremity     Hypertension, essential     Hypothyroidism, acquired     Moderate episode of recurrent major depressive disorder     Primary localized osteoarthritis of right knee     Arthritis of knee     Endometrial cancer (Multi)                  Above medical problems may be reflective of historical medical problems that may have resolved and may not related to acute clinical condition/medical problems.     Clinical impression/plan:        60 y.o. female with PMHx s/f HTN, DLD, T2DM (diet-controlled, last A1c 6.5%), hypothyroidism, psoriasis / psoriatic arthritis (on methotrexate), rosacea, endometrial cancer s/p hysterectomy, obesity, presenting with worsening RLE swelling, discoloration, and skin sloughing.      Worsening RLE Cellulitis and Skin Sloughing   Failure of outpatient treatment   Immunocompromised patient  Elevated inflammatory markers (ESR, CRP)   -Patient does not meet sepsis criteria on admission   -Source: as above  -Imaging: CT RLE ordered   --DVT US negative on 01/11/25 --> will hold off on repeat for now given significant skin sloughing   -Abx allergies: PCN + Cipro (Hives and Rash to both, confirmed on admission)  -Lactate 1.1   -BP is normotensive   -Blood cultures x2 in process   -Follow fever curve, WBCs  -Continue antibiotic coverage with vanc/cefepime/flagyl until seen by ID   -(+) monitor for sxs of toxicity and/or rxn related to vancomycin; appreciate pharmacy assistance with management   -ID consultation appreciated   -Patient has intact pulses and good sensation to the RLE      Recent UTI   -Sxs have resolved   -Has completed tx with Keflex   -UA ordered in ED, pending      Hyponatremia, mild, chronic   -Na baseline in the lower 130s  -Na 135  on admit     HTN, DLD  -BP: controlled on presentation, home therapies will be continued. Close monitoring and adjust as needed.   -DLD: will be continued on home therapies   -Continued outpatient follow-up as scheduled      DM-II   -Optimize glucose control on admission with current infection   -Continue with SSI ACHS   -Accucheks, hypoglycemic protocol   -Monitor and adjust as needed       Hypothyroidism   -Continue home synthroid      Psoriasis / psoriatic arthritis   -Continue home folic acid   -Takes methotrexate on Sundays      Endometrial cancer s/p hysterectomy  -Continue follow-up as scheduled      Morbid Obesity (BMI 50.90)   -Evidenced by BMI as stated above, complicating all aspects of care including increased utilization of hospital resources   -Continued follow-up with PCP and healthy dietary + lifestyle changes as able       Diet: Cardiac, carb controlled   DVT Prophylaxis: Lovenox, ambulate as tolerate (avoid SCDs to RLE)  Code Status: Full Code      Disposition/additional care plan/interventions: 1/22/2025        Immunocompromised host     Wound care consult     Infectious disease consult pending.     Continue current empirical antibiotics     Continue close monitoring, for the pathogen directed therapy in accordance with culture results.        Await CT of the right lower extremity rule out abscess........................agree with plan.     Continue hemodynamic support.      Patient with high complexity characteristics and appeared to be at high risk for clinical decline/deterioration  and unpredictable clinical course.      Disposition/additional care plan/interventions: 1/23/2025     Lower extremity CT scan of tib-fib without IV contrast reviewed: Nonspecific right lower leg soft tissue swelling in the superficial   soft tissues. There is likely a component of chronic venous stasis as   there are numerous tiny soft tissue calcifications. A superimposed   component of cellulitis could be present  as well.       No evidence of abscess or osteomyelitis.      Antibiotic stewardship as per ID/de-escalation as per ID     Continue to monitor clinical response to antibiotic therapy.     Patient appeared nontoxic and appeared hemodynamically stable will continue close monitoring of severe right lower extremity cellulitis.     Notable findings on right lower extremity examination: Decreased warmth appreciated on examination, slight improvement in erythema noted mid anterior shin.      Pansensitive E. coli noted in urine: 20,000 - 80,000 CFU/mL Escherichia coli covered by current antimicrobial therapy       Disposition/additional care plan/interventions: 1/24/2025      Continue antimicrobial stewardship as per ID.    Will follow culture results for the pathogen directed therapy in accordance culture results.    Monitor clinical response to antibiotics ..........continue close monitoring     The patient was informed of differential diagnosis , work up , plan of care and possible sequelae of clinical disposition.Patient in agreement with plan of care. Further recommendations forthcoming in accordance with patient's clinical disposition and response to care.     Discharge planning:Discharge timing to be determined.     Care time: > 35 mins              Dictation performed with assistance of voice recognition device therefore transcription errors are possible.4

## 2025-01-24 NOTE — PROGRESS NOTES
Awaiting discharge ATBX recommendations from ID. Patient will need to follow up at the [facility]. Dr Mcmahon is aware he will need to place a referral. Possible discharge this weekend if no need for IV ATBX at discharge. TCC following.

## 2025-01-24 NOTE — PROGRESS NOTES
Mercedez Ellington is a 60 y.o. female on day 3 of admission presenting with Cellulitis of right lower extremity.      Assessment/Plan:     #Right lower extremity cellulitis  #Hx of psoriasis on MTX hypothyroidism  Patient only got Keflex 500 mg every 12 hours which likely led to the antibiotic failure. Clinically, does not look like necrotizing fasciitis.  CT scan not concerning for nec fac.  Blood cultures negative for 3 hours.  Cellulitis looks maybe a bit more worse and more warm.  Will add Zyvox     Hypothyroidism  Diet-controlled diabetes  Endometrial cancer s/p hysterectomy  Obesity      Recommendations:     -Continue cefazolin 2 g q8h  -Start Zyvox 600 mg every 12 hour  -Will likely switch to oral antibiotics tomorrow  -Will continue to follow      Mir Marie MD  Date of service: 1/24/2025  Time of service: 9:03 AM      Subjective   Interval History: No acute events overnight.  Patient denies any pain.  States overall the wound is looking better        Review of Systems  Denies: fever, chills, nausea, vomiting, diarrhea, dysuria    Objective   Range of Vitals (last 24 hours)  Heart Rate:  [54-69]   Temp:  [35.9 °C (96.7 °F)-37.1 °C (98.8 °F)]   Resp:  [16-19]   BP: (109-129)/(63-73)   SpO2:  [92 %-96 %]  Daily Weight  01/21/25 : 147 kg (325 lb)   Body mass index is 50.9 kg/m².    General: alert, oriented, NAD  HEENT: No conjunctival pallor, no scleral icterus  Neck: No LAD, No JVD  Abdomen: soft, non tender, non distended, BS+  Neuro: AAO x 3, PERRL, CN grossly intact  Extremities: Improving R edema, redness, swelling, warmLE, ruptured blisters  Skin: As above  MSK: No joint inflammation         Antibiotics  ceFAZolin - 2 gram/100 mL      Relevant Results  Labs  Results from last 72 hours   Lab Units 01/24/25  0417 01/23/25  0425 01/22/25  0512 01/21/25  1043   WBC AUTO x10*3/uL 4.8 5.1 9.7 8.2   HEMOGLOBIN g/dL 11.7* 11.6* 12.2 13.2   HEMATOCRIT % 36.9 36.7 38.2 40.8   PLATELETS AUTO x10*3/uL 422 404 442 466    NEUTROS PCT AUTO %  --   --  85.8 80.4   LYMPHS PCT AUTO %  --   --  5.3 10.7   MONOS PCT AUTO %  --   --  5.2 4.6   EOS PCT AUTO %  --   --  2.6 2.8     Results from last 72 hours   Lab Units 01/24/25  0417 01/23/25  0425 01/22/25  0512   SODIUM mmol/L 137 135* 135*   POTASSIUM mmol/L 3.9 3.9 4.4   CHLORIDE mmol/L 102 100 100   CO2 mmol/L 30 29 31   BUN mg/dL 13 10 12   CREATININE mg/dL 0.82 0.84 0.87   GLUCOSE mg/dL 93 113* 91   CALCIUM mg/dL 8.3* 8.2* 8.3*   ANION GAP mmol/L 9* 10 8*   EGFR mL/min/1.73m*2 81 79 75     Results from last 72 hours   Lab Units 01/22/25  0512 01/21/25  1043   ALK PHOS U/L 97 105   BILIRUBIN TOTAL mg/dL 0.6 0.5   PROTEIN TOTAL g/dL 6.5 6.8   ALT U/L 22 20   AST U/L 35 29   ALBUMIN g/dL 3.0* 3.3*     Estimated Creatinine Clearance: 107.6 mL/min (by C-G formula based on SCr of 0.82 mg/dL).  C-Reactive Protein   Date Value Ref Range Status   01/21/2025 13.49 (H) <1.00 mg/dL Final   07/26/2024 1.84 (H) <1.00 mg/dL Final       Microbiology  Susceptibility data from last 14 days.  Collected Specimen Info Organism Ampicillin Cefazolin Cefazolin (uncomplicated UTIs only) Ciprofloxacin Gentamicin Nitrofurantoin Piperacillin/Tazobactam Trimethoprim/Sulfamethoxazole   01/11/25 Urine from Clean Catch/Voided Escherichia coli  S  S  S  S  S  S  S  S       Imaging    CT tibia fibula right wo IV contrast    Result Date: 1/23/2025      Nonspecific right lower leg soft tissue swelling in the superficial soft tissues. There is likely a component of chronic venous stasis as there are numerous tiny soft tissue calcifications. A superimposed component of cellulitis could be present as well.   No evidence of abscess or osteomyelitis.   MACRO: None   Signed by: Sonny Dempsey 1/23/2025 11:25 AM Dictation workstation:   [id number]    CT abdomen pelvis w IV contrast    Result Date: 1/11/2025  Small sliding-type hiatal hernia defect. Likely mild hepatic steatosis. Mild scattered colonic diverticulosis. Signed  by Aris Vanegas MD    Vascular US Lower Extremity Venous Duplex Right    Result Date: 1/11/2025  No evidence for DVT within the right lower extremity. Signed by Aris Vanegas MD

## 2025-01-25 LAB
BACTERIA BLD CULT: NORMAL
BACTERIA BLD CULT: NORMAL
BACTERIA SPEC CULT: NORMAL
ERYTHROCYTE [DISTWIDTH] IN BLOOD BY AUTOMATED COUNT: 16 % (ref 11.5–14.5)
GLUCOSE BLD MANUAL STRIP-MCNC: 102 MG/DL (ref 74–99)
GLUCOSE BLD MANUAL STRIP-MCNC: 121 MG/DL (ref 74–99)
GLUCOSE BLD MANUAL STRIP-MCNC: 123 MG/DL (ref 74–99)
GLUCOSE BLD MANUAL STRIP-MCNC: 125 MG/DL (ref 74–99)
GRAM STN SPEC: NORMAL
GRAM STN SPEC: NORMAL
HCT VFR BLD AUTO: 36.1 % (ref 36–46)
HGB BLD-MCNC: 11.6 G/DL (ref 12–16)
MCH RBC QN AUTO: 30.3 PG (ref 26–34)
MCHC RBC AUTO-ENTMCNC: 32.1 G/DL (ref 32–36)
MCV RBC AUTO: 94 FL (ref 80–100)
NRBC BLD-RTO: 0 /100 WBCS (ref 0–0)
PLATELET # BLD AUTO: 431 X10*3/UL (ref 150–450)
RBC # BLD AUTO: 3.83 X10*6/UL (ref 4–5.2)
WBC # BLD AUTO: 5.3 X10*3/UL (ref 4.4–11.3)

## 2025-01-25 PROCEDURE — 36415 COLL VENOUS BLD VENIPUNCTURE: CPT | Performed by: HOSPITALIST

## 2025-01-25 PROCEDURE — 2500000001 HC RX 250 WO HCPCS SELF ADMINISTERED DRUGS (ALT 637 FOR MEDICARE OP): Performed by: STUDENT IN AN ORGANIZED HEALTH CARE EDUCATION/TRAINING PROGRAM

## 2025-01-25 PROCEDURE — 82947 ASSAY GLUCOSE BLOOD QUANT: CPT

## 2025-01-25 PROCEDURE — 2500000004 HC RX 250 GENERAL PHARMACY W/ HCPCS (ALT 636 FOR OP/ED): Mod: JZ | Performed by: STUDENT IN AN ORGANIZED HEALTH CARE EDUCATION/TRAINING PROGRAM

## 2025-01-25 PROCEDURE — 2500000004 HC RX 250 GENERAL PHARMACY W/ HCPCS (ALT 636 FOR OP/ED): Performed by: STUDENT IN AN ORGANIZED HEALTH CARE EDUCATION/TRAINING PROGRAM

## 2025-01-25 PROCEDURE — 1100000001 HC PRIVATE ROOM DAILY

## 2025-01-25 PROCEDURE — 99232 SBSQ HOSP IP/OBS MODERATE 35: CPT | Performed by: HOSPITALIST

## 2025-01-25 PROCEDURE — 2500000002 HC RX 250 W HCPCS SELF ADMINISTERED DRUGS (ALT 637 FOR MEDICARE OP, ALT 636 FOR OP/ED): Performed by: STUDENT IN AN ORGANIZED HEALTH CARE EDUCATION/TRAINING PROGRAM

## 2025-01-25 PROCEDURE — 85027 COMPLETE CBC AUTOMATED: CPT | Performed by: HOSPITALIST

## 2025-01-25 RX ADMIN — ENOXAPARIN SODIUM 60 MG: 60 INJECTION SUBCUTANEOUS at 05:04

## 2025-01-25 RX ADMIN — FUROSEMIDE 20 MG: 20 TABLET ORAL at 09:20

## 2025-01-25 RX ADMIN — ENOXAPARIN SODIUM 60 MG: 60 INJECTION SUBCUTANEOUS at 17:32

## 2025-01-25 RX ADMIN — CEFAZOLIN SODIUM 2 G: 2 INJECTION, SOLUTION INTRAVENOUS at 05:04

## 2025-01-25 RX ADMIN — CEFAZOLIN SODIUM 2 G: 2 INJECTION, SOLUTION INTRAVENOUS at 13:29

## 2025-01-25 RX ADMIN — METOPROLOL SUCCINATE 50 MG: 50 TABLET, EXTENDED RELEASE ORAL at 09:20

## 2025-01-25 RX ADMIN — LEVOTHYROXINE SODIUM 125 MCG: 0.12 TABLET ORAL at 05:04

## 2025-01-25 RX ADMIN — CEFAZOLIN SODIUM 2 G: 2 INJECTION, SOLUTION INTRAVENOUS at 19:50

## 2025-01-25 RX ADMIN — ESCITALOPRAM OXALATE 20 MG: 10 TABLET ORAL at 09:20

## 2025-01-25 RX ADMIN — LINEZOLID 600 MG: 600 TABLET, FILM COATED ORAL at 21:15

## 2025-01-25 RX ADMIN — LINEZOLID 600 MG: 600 TABLET, FILM COATED ORAL at 09:20

## 2025-01-25 RX ADMIN — ACETAMINOPHEN 650 MG: 325 TABLET ORAL at 19:50

## 2025-01-25 RX ADMIN — PANTOPRAZOLE SODIUM 40 MG: 40 TABLET, DELAYED RELEASE ORAL at 05:04

## 2025-01-25 RX ADMIN — FOLIC ACID 1 MG: 1 TABLET ORAL at 05:04

## 2025-01-25 ASSESSMENT — COGNITIVE AND FUNCTIONAL STATUS - GENERAL
MOBILITY SCORE: 24
MOBILITY SCORE: 24
DAILY ACTIVITIY SCORE: 24
DAILY ACTIVITIY SCORE: 24
MOBILITY SCORE: 24
DAILY ACTIVITIY SCORE: 24

## 2025-01-25 ASSESSMENT — PAIN SCALES - GENERAL
PAINLEVEL_OUTOF10: 5 - MODERATE PAIN
PAINLEVEL_OUTOF10: 3
PAINLEVEL_OUTOF10: 5 - MODERATE PAIN

## 2025-01-25 ASSESSMENT — PAIN - FUNCTIONAL ASSESSMENT
PAIN_FUNCTIONAL_ASSESSMENT: 0-10
PAIN_FUNCTIONAL_ASSESSMENT: 0-10

## 2025-01-25 ASSESSMENT — PAIN DESCRIPTION - LOCATION: LOCATION: HEAD

## 2025-01-25 NOTE — CARE PLAN
The patient's goals for the shift include      The clinical goals for the shift include Patient will report pain 3/10 or less throughout shift      Problem: Safety - Adult  Goal: Free from fall injury  Outcome: Progressing     Problem: Discharge Planning  Goal: Discharge to home or other facility with appropriate resources  Outcome: Progressing     Problem: Chronic Conditions and Co-morbidities  Goal: Patient's chronic conditions and co-morbidity symptoms are monitored and maintained or improved  Outcome: Progressing     Problem: Skin  Goal: Decreased wound size/increased tissue granulation at next dressing change  Outcome: Progressing  Goal: Participates in plan/prevention/treatment measures  Outcome: Progressing  Goal: Prevent/manage excess moisture  Outcome: Progressing  Goal: Prevent/minimize sheer/friction injuries  Outcome: Progressing  Goal: Promote/optimize nutrition  Outcome: Progressing  Goal: Promote skin healing  Outcome: Progressing     Problem: Pain  Goal: Walks with improved pain control throughout the shift  Outcome: Progressing  Goal: Performs ADL's with improved pain control throughout shift  Outcome: Progressing  Goal: Participates in PT with improved pain control throughout the shift  Outcome: Progressing  Goal: Free from opioid side effects throughout the shift  Outcome: Progressing  Goal: Free from acute confusion related to pain meds throughout the shift  Outcome: Progressing     Problem: Fall/Injury  Goal: Not fall by end of shift  Outcome: Progressing  Goal: Be free from injury by end of the shift  Outcome: Progressing  Goal: Verbalize understanding of personal risk factors for fall in the hospital  Outcome: Progressing  Goal: Verbalize understanding of risk factor reduction measures to prevent injury from fall in the home  Outcome: Progressing  Goal: Use assistive devices by end of the shift  Outcome: Progressing  Goal: Pace activities to prevent fatigue by end of the shift  Outcome:  Progressing     Problem: Diabetes  Goal: Maintain glucose levels >70mg/dl to <250mg/dl throughout shift  Outcome: Progressing  Goal: No changes in neurological exam by end of shift  Outcome: Progressing  Goal: Increase self care and/or family involovement by end of shift  Outcome: Progressing

## 2025-01-25 NOTE — CARE PLAN
The patient's goals for the shift include      The clinical goals for the shift include free from falls and injuries, tolerate antibiotics, vitals stable      Problem: Safety - Adult  Goal: Free from fall injury  Outcome: Progressing

## 2025-01-25 NOTE — PROGRESS NOTES
Mercedez Ellington [id number]   Service: Internal Medicine / Hospitalist Date of service: 1/25/2025                                  Full Code                           Subjective     History Of Present Illness (HPI):  Mercedez Ellington is a 60 y.o. female with PMHx s/f HTN, DLD, T2DM (diet-controlled, last A1c 6.5%), hypothyroidism, psoriasis / psoriatic arthritis (on methotrexate), rosacea, endometrial cancer s/p hysterectomy, obesity, presenting with worsening RLE swelling, discoloration, and skin sloughing. Pt was initially seen in the ED 01/15/25 for RLE swelling and groin pain, was diagnosed with UTI and RLE cellulitis (had a negative DVT US). She was discharged on Keflex and Bactrim. Since discharge, she had been doing fairly well and was experiencing improvement in the burning pain she had initially had in the RLE since prior to start of abx. She had also been wrapping her RLE and applying a topical 3-antibiotic ointment. She reports that she did miss two days of changing the dressing and when her daughter came to assist with the dressing change, it was noted that she had worsening of the redness and more blistering / skin peeling than she had originally had. It was also more swollen compared to prior. She reports no fevers since the original day she came to the ED (Tmax 101.7 at home on 01/10/25). She has chronic chills but that is unchanged. She reports resolution of UTI symptoms. She has a cold sore on the lower portion of the R side of her mouth but denies any skin changes (outside of chronic psoriatic patches) anywhere else on her body or mouth. Her appetite has been present but slightly diminished from baseline. Denies cp/pressure, palpitations, diaphoresis, SOB, HURST, dizziness / lightheadedness, syncope or near syncope, HA, vision changes, f/n/v/d/abd pain. She denies hx VTE. She has been able to place weight on the RLE and denies issues with ROM of the knee / ankle. Notably does have history of bilateral knee  replacements 2/2 OA.      ED Course (Summary - please note all labs, imaging studies, and interventions noted below have been personally reviewed and/or interpreted on day of admission):   Vitals on presentation: T96.3, /64, HR 71, RR 18, SpO2 96% RA  Labs: CBC with WBC 8.2, Hgb 13.2, platelets 441.  CMP with glucose 98, sodium 135, potassium 3.8, BUN 10, serum creatinine 0.84.  Lactate 1.1.  I ordered add on CRP (13.49), ESR (108).  Blood cultures x 2 were collected in the ED and are in process.  Imaging: none completed day of admission   Neg DVT US from 01/11/25   Interventions: Vancomycin/Ancef 2 g, admitted to medicine     1/22................No reported: Neck pain, chest pains, nausea, vomiting, fevers or chills.No increasing leg pain reported by patient.     1/23..................The patient reported overall she felt a bit better today.  No reported :palpitations, headaches, fevers, chills, nausea, vomiting or increasing right leg pain.     1/24...................Patient seen and examined with her son at the bedside.  Patient endorsed nausea no other complaints voiced by patient today.  No reported abdominal pain, emesis, chest pains, fevers, chills, nausea or vomiting.    1/25....................Patient reported feeling better today.  Still reported some nausea but no associated abdominal pain, constipation, emesis, fevers, chills or palpitations.She reported neuropathy in her lower extremities.        Review of Systems:   Review of system otherwise negative if not aforementioned above in subjective.     Objective        Physical Exam      Constitutional:       Appearance: Patient appeared in no acute cardiopulmonary distress.     Comments: Patient alert and oriented to person place time and situation.  HEENT:      Head: Normocephalic and atraumatic.Trachea midline      Nose:No observed congestion or rhinorrhea.     Mouth/Throat: Mucous membranes Moist, Trachea appeared  midline.  Eyes:      Extraocular  Movements: Extraocular movements intact.      Pupils: Pupils are equal, round, and reactive to light.      Comments: No scleral icterus or conjunctival injection appreciated.   Cardiovascular:      Rate and Rhythm: Normal rate and regular rhythm. No clicks rubs or gallops, normal S1 and S2.No peripheral stigmata of endocarditis appreciated.     Pulmonary:      Lungs appeared clear to auscultation, no adventitious sound appreciated.  Abdominal:      General: Abdomen soft, nontender, active bowel sounds, no involuntary guarding or rebound tenderness appreciated.     Comments: None   Musculoskeletal:       Patient appeared to have full active range of motion for upper and lower extremities, no acute apparent joint deformity appreciated on examination.   No pitting edema or cyanosis appreciated.       Lymphadenopathy:      No appreciable palpable lymphadenopathy  Skin:     General: Skin is warm.      Coloration:  No jaundice     Findings: Right lower extremity cellulitis, Appeared improved today.  Right leg wrapped.  No drainage appreciated.     Neurological:      General: No focal sensory or motor deficits appreciated, no meningeal signs or dysmetria noted.      Cranial Nerves: Cranial nerves II to XII appearing grossly intact.     Genitals:  Deferred  Psychiatric:         The patient appears to be displaying normal mood and affect at the time of evaluation.     Labs:              Lab Results   Component Value Date     GLUCOSE 93 01/24/2025     CALCIUM 8.3 (L) 01/24/2025      01/24/2025     K 3.9 01/24/2025     CO2 30 01/24/2025      01/24/2025     BUN 13 01/24/2025     CREATININE 0.82 01/24/2025       Lab Results   Component Value Date    GLUCOSE 93 01/24/2025    CALCIUM 8.3 (L) 01/24/2025     01/24/2025    K 3.9 01/24/2025    CO2 30 01/24/2025     01/24/2025    BUN 13 01/24/2025    CREATININE 0.82 01/24/2025         Lab Results   Component Value Date     WBC 4.8 01/24/2025     HGB 11.7 (L)  01/24/2025     HCT 36.9 01/24/2025     MCV 95 01/24/2025      01/24/2025      Lab Results   Component Value Date    WBC 5.3 01/25/2025    HGB 11.6 (L) 01/25/2025    HCT 36.1 01/25/2025    MCV 94 01/25/2025     01/25/2025      [unfilled]   [unfilled]   Susceptibility data from last 90 days.  Collected Specimen Info Organism Ampicillin Cefazolin Cefazolin (uncomplicated UTIs only) Ciprofloxacin Gentamicin Nitrofurantoin Piperacillin/Tazobactam Trimethoprim/Sulfamethoxazole   01/11/25 Urine from Clean Catch/Voided Escherichia coli  S  S  S  S  S  S  S  S                                                   Revision History             Medical Problems         Problem List         * (Principal) Cellulitis of right lower extremity     Hypertension, essential     Hypothyroidism, acquired     Moderate episode of recurrent major depressive disorder     Primary localized osteoarthritis of right knee     Arthritis of knee     Endometrial cancer (Multi)                  Above medical problems may be reflective of historical medical problems that may have resolved and may not related to acute clinical condition/medical problems.     Clinical impression/plan:        60 y.o. female with PMHx s/f HTN, DLD, T2DM (diet-controlled, last A1c 6.5%), hypothyroidism, psoriasis / psoriatic arthritis (on methotrexate), rosacea, endometrial cancer s/p hysterectomy, obesity, presenting with worsening RLE swelling, discoloration, and skin sloughing.      Worsening RLE Cellulitis and Skin Sloughing   Failure of outpatient treatment   Immunocompromised patient  Elevated inflammatory markers (ESR, CRP)   -Patient does not meet sepsis criteria on admission   -Source: as above  -Imaging: CT RLE ordered   --DVT US negative on 01/11/25 --> will hold off on repeat for now given significant skin sloughing   -Abx allergies: PCN + Cipro (Hives and Rash to both, confirmed on admission)  -Lactate 1.1   -BP is normotensive   -Blood  cultures x2 in process   -Follow fever curve, WBCs  -Continue antibiotic coverage with vanc/cefepime/flagyl until seen by ID   -(+) monitor for sxs of toxicity and/or rxn related to vancomycin; appreciate pharmacy assistance with management   -ID consultation appreciated   -Patient has intact pulses and good sensation to the RLE      Recent UTI   -Sxs have resolved   -Has completed tx with Keflex   -UA ordered in ED, pending      Hyponatremia, mild, chronic   -Na baseline in the lower 130s  -Na 135 on admit     HTN, DLD  -BP: controlled on presentation, home therapies will be continued. Close monitoring and adjust as needed.   -DLD: will be continued on home therapies   -Continued outpatient follow-up as scheduled      DM-II   -Optimize glucose control on admission with current infection   -Continue with SSI ACHS   -Accucheks, hypoglycemic protocol   -Monitor and adjust as needed       Hypothyroidism   -Continue home synthroid      Psoriasis / psoriatic arthritis   -Continue home folic acid   -Takes methotrexate on Sundays      Endometrial cancer s/p hysterectomy  -Continue follow-up as scheduled      Morbid Obesity (BMI 50.90)   -Evidenced by BMI as stated above, complicating all aspects of care including increased utilization of hospital resources   -Continued follow-up with PCP and healthy dietary + lifestyle changes as able       Diet: Cardiac, carb controlled   DVT Prophylaxis: Lovenox, ambulate as tolerate (avoid SCDs to RLE)  Code Status: Full Code      Disposition/additional care plan/interventions: 1/22/2025        Immunocompromised host     Wound care consult     Infectious disease consult pending.     Continue current empirical antibiotics     Continue close monitoring, for the pathogen directed therapy in accordance with culture results.        Await CT of the right lower extremity rule out abscess........................agree with plan.     Continue hemodynamic support.      Patient with high complexity  characteristics and appeared to be at high risk for clinical decline/deterioration  and unpredictable clinical course.      Disposition/additional care plan/interventions: 1/23/2025     Lower extremity CT scan of tib-fib without IV contrast reviewed: Nonspecific right lower leg soft tissue swelling in the superficial   soft tissues. There is likely a component of chronic venous stasis as   there are numerous tiny soft tissue calcifications. A superimposed   component of cellulitis could be present as well.       No evidence of abscess or osteomyelitis.      Antibiotic stewardship as per ID/de-escalation as per ID     Continue to monitor clinical response to antibiotic therapy.     Patient appeared nontoxic and appeared hemodynamically stable will continue close monitoring of severe right lower extremity cellulitis.     Notable findings on right lower extremity examination: Decreased warmth appreciated on examination, slight improvement in erythema noted mid anterior shin.      Pansensitive E. coli noted in urine: 20,000 - 80,000 CFU/mL Escherichia coli covered by current antimicrobial therapy         Disposition/additional care plan/interventions: 1/24/2025        Continue antimicrobial stewardship as per ID.     Will follow culture results for the pathogen directed therapy in accordance culture results.     Monitor clinical response to antibiotics ..........continue close monitoring     Disposition/additional care plan/interventions: 1/25/2025     Zyvox added by ID, appreciate ID recommendations for discharge planning      Continue cefazolin 2 g every 8 hours.    Will continue inpatient monitoring with IV antibiotics for the next 24 to 48 hours will monitor clinical response anticipate discharge likely in the next 48 hours.      The patient was informed of differential diagnosis , work up , plan of care and possible sequelae of clinical disposition.Patient in agreement with plan of care. Further recommendations  forthcoming in accordance with patient's clinical disposition and response to care.     Discharge planning:Discharge timing to be determined.     Care time: > 35 mins              Dictation performed with assistance of voice recognition device therefore transcription errors are possible.4

## 2025-01-25 NOTE — DOCUMENTATION CLARIFICATION NOTE
"    PATIENT:               HEYDI MONTANO  ACCT #:                  [id number]  MRN:                       [id number]  :                       1965  ADMIT DATE:       2025 9:44 AM  DISCH DATE:  RESPONDING PROVIDER #:        [id number]          PROVIDER RESPONSE TEXT:    Rapid progressive right lower extremity cellulitis diabetes related in a diabetic host    CDI QUERY TEXT:    Clarification    Instruction:    Based on your assessment of the patient and the clinical information, please provide the requested documentation by clicking on the appropriate radio button and enter any additional information if prompted.    Question: Please further clarify the relationship between DM and RLE cellulitis    When answering this query, please exercise your independent professional judgment. The fact that a question is being asked, does not imply that any particular answer is desired or expected.    The patient's clinical indicators include:  Clinical Information: Patient admitted with cellulitis with noted DM 2    Clinical Indicators: Per ED, \"Cellulitis of right lower extremity.\"    Per H and P, \"Worsening RLE Cellulitis and Skin Sloughing...  DM-II  -Optimize glucose control on admission with current infection.\"    Per ID PN, \"Right lower extremity cellulitis...  Patient only got Keflex 500 mg every 12 hours which likely led to the antibiotic failure. Clinically, does not look like necrotizing fasciitis.  CT scan not concerning for nec fac.  Blood cultures negative for 3 hours.  Cellulitis looks maybe a bit more worse and more warm.  Will add Zyvox...  Diet-controlled diabetes.\"    Treatment: sliding scale insulin, monitoring blood sugars, IV ancef- active, IV cefepime- d/roma, IV flagyl- d/roma, IV vanco- d/roma, ID consult    Risk Factors: 60yof admitted with cellulitis with noted DM 2  Options provided:  -- RLE cellulitis is related to Diabetes  -- RLE cellulitis is unrelated to Diabetes  -- Other - I will add my own " diagnosis  -- Refer to Clinical Documentation Reviewer    Query created by: Aida Kaminski on 1/24/2025 12:12 PM      Electronically signed by:  ISABELA AHUJA DO 1/25/2025 9:02 AM

## 2025-01-25 NOTE — PROGRESS NOTES
Mercedez Ellington is a 60 y.o. female on day 4 of admission presenting with Cellulitis of right lower extremity.      Assessment/Plan:     #Right lower extremity cellulitis  #Hx of psoriasis on MTX   Patient only got Keflex 500 mg every 12 hours which likely led to the antibiotic failure. Clinically, does not look like necrotizing fasciitis.  CT scan not concerning for nec fac.  Blood cultures negative for 3 hours.  Cellulitis looks maybe a bit more worse and more warm.  Will add Zyvox    #Concern for drug side effect  Discussed in detail about side effect of antibiotic linezolid including peripheral neuropathy, myelosuppression, serotonin syndrome.  Patient understands the side effects and risks and agrees to take the antibiotic       Hypothyroidism  Diet-controlled diabetes  Endometrial cancer s/p hysterectomy  Obesity      Recommendations:     -Continue cefazolin 2 g q8h  -Start Zyvox 600 mg every 12 hour  -If discharge is planned, can switch to cefadroxil 1 g every 12 hours and continue Zyvox 600 mg every 12 hours for 10 more days  -Patient to follow-up with me in the clinic  -Will continue to follow      Mir Marie MD  Date of service: 1/25/2025  Time of service: 6:25 AM      Subjective   Interval History: No acute events overnight.  Patient denies any complaint.        Review of Systems  Denies: fever, chills, nausea, vomiting, diarrhea    Objective   Range of Vitals (last 24 hours)  Heart Rate:  [56-86]   Temp:  [35.8 °C (96.5 °F)-36.4 °C (97.6 °F)]   Resp:  [16-18]   BP: (101-132)/(61-75)   SpO2:  [90 %-98 %]  Daily Weight  01/21/25 : 147 kg (325 lb)   Body mass index is 50.9 kg/m².    General: alert, oriented, NAD  HEENT: No conjunctival pallor, no scleral icterus  Neck: No LAD, No JVD  Abdomen: soft, non tender, non distended, BS+  Neuro: AAO x 3, PERRL, CN grossly intact  Extremities: Right lower extremity clean dressing  MSK: No joint inflammation         Antibiotics  ceFAZolin - 2 gram/100 mL  linezolid -  600 mg      Relevant Results  Labs  Results from last 72 hours   Lab Units 01/25/25  0502 01/24/25  0417 01/23/25  0425   WBC AUTO x10*3/uL 5.3 4.8 5.1   HEMOGLOBIN g/dL 11.6* 11.7* 11.6*   HEMATOCRIT % 36.1 36.9 36.7   PLATELETS AUTO x10*3/uL 431 422 404     Results from last 72 hours   Lab Units 01/24/25  0417 01/23/25  0425   SODIUM mmol/L 137 135*   POTASSIUM mmol/L 3.9 3.9   CHLORIDE mmol/L 102 100   CO2 mmol/L 30 29   BUN mg/dL 13 10   CREATININE mg/dL 0.82 0.84   GLUCOSE mg/dL 93 113*   CALCIUM mg/dL 8.3* 8.2*   ANION GAP mmol/L 9* 10   EGFR mL/min/1.73m*2 81 79         Estimated Creatinine Clearance: 107.6 mL/min (by C-G formula based on SCr of 0.82 mg/dL).  C-Reactive Protein   Date Value Ref Range Status   01/21/2025 13.49 (H) <1.00 mg/dL Final   07/26/2024 1.84 (H) <1.00 mg/dL Final       Microbiology  Susceptibility data from last 14 days.  Collected Specimen Info Organism Ampicillin Cefazolin Cefazolin (uncomplicated UTIs only) Ciprofloxacin Gentamicin Nitrofurantoin Piperacillin/Tazobactam Trimethoprim/Sulfamethoxazole   01/11/25 Urine from Clean Catch/Voided Escherichia coli  S  S  S  S  S  S  S  S       Imaging    CT tibia fibula right wo IV contrast    Result Date: 1/23/2025      Nonspecific right lower leg soft tissue swelling in the superficial soft tissues. There is likely a component of chronic venous stasis as there are numerous tiny soft tissue calcifications. A superimposed component of cellulitis could be present as well.   No evidence of abscess or osteomyelitis.   MACRO: None   Signed by: Sonny Dempsey 1/23/2025 11:25 AM Dictation workstation:   [id number]    CT abdomen pelvis w IV contrast    Result Date: 1/11/2025  Small sliding-type hiatal hernia defect. Likely mild hepatic steatosis. Mild scattered colonic diverticulosis. Signed by Aris Vanegas MD    Vascular US Lower Extremity Venous Duplex Right    Result Date: 1/11/2025  No evidence for DVT within the right lower extremity.  Signed by Aris Vanegas MD

## 2025-01-26 VITALS
SYSTOLIC BLOOD PRESSURE: 124 MMHG | DIASTOLIC BLOOD PRESSURE: 64 MMHG | BODY MASS INDEX: 45.99 KG/M2 | HEART RATE: 59 BPM | OXYGEN SATURATION: 95 % | WEIGHT: 293 LBS | RESPIRATION RATE: 18 BRPM | TEMPERATURE: 97.1 F | HEIGHT: 67 IN

## 2025-01-26 LAB
GLUCOSE BLD MANUAL STRIP-MCNC: 130 MG/DL (ref 74–99)
GLUCOSE BLD MANUAL STRIP-MCNC: 136 MG/DL (ref 74–99)
GLUCOSE BLD MANUAL STRIP-MCNC: 84 MG/DL (ref 74–99)
GLUCOSE BLD MANUAL STRIP-MCNC: 90 MG/DL (ref 74–99)

## 2025-01-26 PROCEDURE — 2500000004 HC RX 250 GENERAL PHARMACY W/ HCPCS (ALT 636 FOR OP/ED): Performed by: STUDENT IN AN ORGANIZED HEALTH CARE EDUCATION/TRAINING PROGRAM

## 2025-01-26 PROCEDURE — 82947 ASSAY GLUCOSE BLOOD QUANT: CPT

## 2025-01-26 PROCEDURE — 2500000001 HC RX 250 WO HCPCS SELF ADMINISTERED DRUGS (ALT 637 FOR MEDICARE OP): Performed by: STUDENT IN AN ORGANIZED HEALTH CARE EDUCATION/TRAINING PROGRAM

## 2025-01-26 PROCEDURE — 2500000002 HC RX 250 W HCPCS SELF ADMINISTERED DRUGS (ALT 637 FOR MEDICARE OP, ALT 636 FOR OP/ED): Performed by: STUDENT IN AN ORGANIZED HEALTH CARE EDUCATION/TRAINING PROGRAM

## 2025-01-26 PROCEDURE — 2500000004 HC RX 250 GENERAL PHARMACY W/ HCPCS (ALT 636 FOR OP/ED): Mod: JZ | Performed by: STUDENT IN AN ORGANIZED HEALTH CARE EDUCATION/TRAINING PROGRAM

## 2025-01-26 PROCEDURE — 99232 SBSQ HOSP IP/OBS MODERATE 35: CPT | Performed by: HOSPITALIST

## 2025-01-26 PROCEDURE — 1100000001 HC PRIVATE ROOM DAILY

## 2025-01-26 RX ADMIN — LINEZOLID 600 MG: 600 TABLET, FILM COATED ORAL at 08:24

## 2025-01-26 RX ADMIN — PANTOPRAZOLE SODIUM 40 MG: 40 TABLET, DELAYED RELEASE ORAL at 06:28

## 2025-01-26 RX ADMIN — LINEZOLID 600 MG: 600 TABLET, FILM COATED ORAL at 20:01

## 2025-01-26 RX ADMIN — LEVOTHYROXINE SODIUM 125 MCG: 0.12 TABLET ORAL at 06:28

## 2025-01-26 RX ADMIN — FUROSEMIDE 20 MG: 20 TABLET ORAL at 08:24

## 2025-01-26 RX ADMIN — CEFAZOLIN SODIUM 2 G: 2 INJECTION, SOLUTION INTRAVENOUS at 20:01

## 2025-01-26 RX ADMIN — ENOXAPARIN SODIUM 60 MG: 60 INJECTION SUBCUTANEOUS at 18:10

## 2025-01-26 RX ADMIN — ENOXAPARIN SODIUM 60 MG: 60 INJECTION SUBCUTANEOUS at 06:28

## 2025-01-26 RX ADMIN — ESCITALOPRAM OXALATE 20 MG: 10 TABLET ORAL at 08:24

## 2025-01-26 RX ADMIN — METOPROLOL SUCCINATE 50 MG: 50 TABLET, EXTENDED RELEASE ORAL at 08:24

## 2025-01-26 RX ADMIN — CEFAZOLIN SODIUM 2 G: 2 INJECTION, SOLUTION INTRAVENOUS at 04:38

## 2025-01-26 RX ADMIN — FOLIC ACID 1 MG: 1 TABLET ORAL at 06:28

## 2025-01-26 RX ADMIN — CEFAZOLIN SODIUM 2 G: 2 INJECTION, SOLUTION INTRAVENOUS at 12:46

## 2025-01-26 ASSESSMENT — COGNITIVE AND FUNCTIONAL STATUS - GENERAL
DAILY ACTIVITIY SCORE: 24
DAILY ACTIVITIY SCORE: 24
MOBILITY SCORE: 24
MOBILITY SCORE: 24

## 2025-01-26 ASSESSMENT — PAIN SCALES - GENERAL
PAINLEVEL_OUTOF10: 2
PAINLEVEL_OUTOF10: 0 - NO PAIN

## 2025-01-26 ASSESSMENT — PAIN - FUNCTIONAL ASSESSMENT
PAIN_FUNCTIONAL_ASSESSMENT: 0-10
PAIN_FUNCTIONAL_ASSESSMENT: 0-10

## 2025-01-26 NOTE — PROGRESS NOTES
Mercedez Ellington [id number]   Service: Internal Medicine / Hospitalist Date of service: 1/26/2025                                  Full Code                           Subjective     History Of Present Illness (HPI):  Mercedez Ellington is a 60 y.o. female with PMHx s/f HTN, DLD, T2DM (diet-controlled, last A1c 6.5%), hypothyroidism, psoriasis / psoriatic arthritis (on methotrexate), rosacea, endometrial cancer s/p hysterectomy, obesity, presenting with worsening RLE swelling, discoloration, and skin sloughing. Pt was initially seen in the ED 01/15/25 for RLE swelling and groin pain, was diagnosed with UTI and RLE cellulitis (had a negative DVT US). She was discharged on Keflex and Bactrim. Since discharge, she had been doing fairly well and was experiencing improvement in the burning pain she had initially had in the RLE since prior to start of abx. She had also been wrapping her RLE and applying a topical 3-antibiotic ointment. She reports that she did miss two days of changing the dressing and when her daughter came to assist with the dressing change, it was noted that she had worsening of the redness and more blistering / skin peeling than she had originally had. It was also more swollen compared to prior. She reports no fevers since the original day she came to the ED (Tmax 101.7 at home on 01/10/25). She has chronic chills but that is unchanged. She reports resolution of UTI symptoms. She has a cold sore on the lower portion of the R side of her mouth but denies any skin changes (outside of chronic psoriatic patches) anywhere else on her body or mouth. Her appetite has been present but slightly diminished from baseline. Denies cp/pressure, palpitations, diaphoresis, SOB, HURST, dizziness / lightheadedness, syncope or near syncope, HA, vision changes, f/n/v/d/abd pain. She denies hx VTE. She has been able to place weight on the RLE and denies issues with ROM of the knee / ankle. Notably does have history of bilateral knee  replacements 2/2 OA.      ED Course (Summary - please note all labs, imaging studies, and interventions noted below have been personally reviewed and/or interpreted on day of admission):   Vitals on presentation: T96.3, /64, HR 71, RR 18, SpO2 96% RA  Labs: CBC with WBC 8.2, Hgb 13.2, platelets 441.  CMP with glucose 98, sodium 135, potassium 3.8, BUN 10, serum creatinine 0.84.  Lactate 1.1.  I ordered add on CRP (13.49), ESR (108).  Blood cultures x 2 were collected in the ED and are in process.  Imaging: none completed day of admission   Neg DVT US from 01/11/25   Interventions: Vancomycin/Ancef 2 g, admitted to medicine     1/22................No reported: Neck pain, chest pains, nausea, vomiting, fevers or chills.No increasing leg pain reported by patient.     1/23..................The patient reported overall she felt a bit better today.  No reported :palpitations, headaches, fevers, chills, nausea, vomiting or increasing right leg pain.     1/24...................Patient seen and examined with her son at the bedside.  Patient endorsed nausea no other complaints voiced by patient today.  No reported abdominal pain, emesis, chest pains, fevers, chills, nausea or vomiting.     1/25....................Patient reported feeling better today.  Still reported some nausea but no associated abdominal pain, constipation, emesis, fevers, chills or palpitations.She reported neuropathy in her lower extremities.    1/26......................Patient reported no acute complaints apart from some occasional itching of the right lower extremity. No reported: Chest pains, palpitations, nausea, chills.        Review of Systems:   Review of system otherwise negative if not aforementioned above in subjective.     Objective        Physical Exam      Constitutional:       Appearance: Patient appeared in no acute cardiopulmonary distress.     Comments: Patient alert and oriented to person place time and situation.  HEENT:       Head: Normocephalic and atraumatic.Trachea midline      Nose:No observed congestion or rhinorrhea.     Mouth/Throat: Mucous membranes Moist, Trachea appeared  midline.  Eyes:      Extraocular Movements: Extraocular movements intact.      Pupils: Pupils are equal, round, and reactive to light.      Comments: No scleral icterus or conjunctival injection appreciated.   Cardiovascular:      Rate and Rhythm: Normal rate and regular rhythm. No clicks rubs or gallops, normal S1 and S2.No peripheral stigmata of endocarditis appreciated.     Pulmonary:      Lungs appeared clear to auscultation, no adventitious sound appreciated.  Abdominal:      General: Abdomen soft, nontender, active bowel sounds, no involuntary guarding or rebound tenderness appreciated.     Comments: None   Musculoskeletal:       Patient appeared to have full active range of motion for upper and lower extremities, no acute apparent joint deformity appreciated on examination.   No pitting edema or cyanosis appreciated.       Lymphadenopathy:      No appreciable palpable lymphadenopathy  Skin:     General: Skin is warm.      Coloration:  No jaundice     Findings: Right lower extremity cellulitis, Appeared improved today.  Right leg wrapped.  No drainage appreciated.     Neurological:      General: No focal sensory or motor deficits appreciated, no meningeal signs or dysmetria noted.      Cranial Nerves: Cranial nerves II to XII appearing grossly intact.     Genitals:  Deferred  Psychiatric:         The patient appears to be displaying normal mood and affect at the time of evaluation.     Labs:                  Lab Results        Lab Results   Component Value Date    GLUCOSE 93 01/24/2025    CALCIUM 8.3 (L) 01/24/2025     01/24/2025    K 3.9 01/24/2025    CO2 30 01/24/2025     01/24/2025    BUN 13 01/24/2025    CREATININE 0.82 01/24/2025      Lab Results   Component Value Date    WBC 5.3 01/25/2025    HGB 11.6 (L) 01/25/2025    HCT 36.1  01/25/2025    MCV 94 01/25/2025     01/25/2025         [unfilled]   [unfilled]   Susceptibility data from last 90 days.  Collected Specimen Info Organism Ampicillin Cefazolin Cefazolin (uncomplicated UTIs only) Ciprofloxacin Gentamicin Nitrofurantoin Piperacillin/Tazobactam Trimethoprim/Sulfamethoxazole   01/11/25 Urine from Clean Catch/Voided Escherichia coli  S  S  S  S  S  S  S  S                                                   Revision History             Medical Problems         Problem List         * (Principal) Cellulitis of right lower extremity     Hypertension, essential     Hypothyroidism, acquired     Moderate episode of recurrent major depressive disorder     Primary localized osteoarthritis of right knee     Arthritis of knee     Endometrial cancer (Multi)                  Above medical problems may be reflective of historical medical problems that may have resolved and may not related to acute clinical condition/medical problems.     Clinical impression/plan:        60 y.o. female with PMHx s/f HTN, DLD, T2DM (diet-controlled, last A1c 6.5%), hypothyroidism, psoriasis / psoriatic arthritis (on methotrexate), rosacea, endometrial cancer s/p hysterectomy, obesity, presenting with worsening RLE swelling, discoloration, and skin sloughing.      Worsening RLE Cellulitis and Skin Sloughing   Failure of outpatient treatment   Immunocompromised patient  Elevated inflammatory markers (ESR, CRP)   -Patient does not meet sepsis criteria on admission   -Source: as above  -Imaging: CT RLE ordered   --DVT US negative on 01/11/25 --> will hold off on repeat for now given significant skin sloughing   -Abx allergies: PCN + Cipro (Hives and Rash to both, confirmed on admission)  -Lactate 1.1   -BP is normotensive   -Blood cultures x2 in process   -Follow fever curve, WBCs  -Continue antibiotic coverage with vanc/cefepime/flagyl until seen by ID   -(+) monitor for sxs of toxicity and/or rxn related  to vancomycin; appreciate pharmacy assistance with management   -ID consultation appreciated   -Patient has intact pulses and good sensation to the RLE      Recent UTI   -Sxs have resolved   -Has completed tx with Keflex   -UA ordered in ED, pending      Hyponatremia, mild, chronic   -Na baseline in the lower 130s  -Na 135 on admit     HTN, DLD  -BP: controlled on presentation, home therapies will be continued. Close monitoring and adjust as needed.   -DLD: will be continued on home therapies   -Continued outpatient follow-up as scheduled      DM-II   -Optimize glucose control on admission with current infection   -Continue with SSI ACHS   -Accucheks, hypoglycemic protocol   -Monitor and adjust as needed       Hypothyroidism   -Continue home synthroid      Psoriasis / psoriatic arthritis   -Continue home folic acid   -Takes methotrexate on Sundays      Endometrial cancer s/p hysterectomy  -Continue follow-up as scheduled      Morbid Obesity (BMI 50.90)   -Evidenced by BMI as stated above, complicating all aspects of care including increased utilization of hospital resources   -Continued follow-up with PCP and healthy dietary + lifestyle changes as able       Diet: Cardiac, carb controlled   DVT Prophylaxis: Lovenox, ambulate as tolerate (avoid SCDs to RLE)  Code Status: Full Code      Disposition/additional care plan/interventions: 1/22/2025        Immunocompromised host     Wound care consult     Infectious disease consult pending.     Continue current empirical antibiotics     Continue close monitoring, for the pathogen directed therapy in accordance with culture results.        Await CT of the right lower extremity rule out abscess........................agree with plan.     Continue hemodynamic support.      Patient with high complexity characteristics and appeared to be at high risk for clinical decline/deterioration  and unpredictable clinical course.      Disposition/additional care plan/interventions:  1/23/2025     Lower extremity CT scan of tib-fib without IV contrast reviewed: Nonspecific right lower leg soft tissue swelling in the superficial   soft tissues. There is likely a component of chronic venous stasis as   there are numerous tiny soft tissue calcifications. A superimposed   component of cellulitis could be present as well.       No evidence of abscess or osteomyelitis.      Antibiotic stewardship as per ID/de-escalation as per ID     Continue to monitor clinical response to antibiotic therapy.     Patient appeared nontoxic and appeared hemodynamically stable will continue close monitoring of severe right lower extremity cellulitis.     Notable findings on right lower extremity examination: Decreased warmth appreciated on examination, slight improvement in erythema noted mid anterior shin.      Pansensitive E. coli noted in urine: 20,000 - 80,000 CFU/mL Escherichia coli covered by current antimicrobial therapy         Disposition/additional care plan/interventions: 1/24/2025        Continue antimicrobial stewardship as per ID.     Will follow culture results for the pathogen directed therapy in accordance culture results.     Monitor clinical response to antibiotics ..........continue close monitoring      Disposition/additional care plan/interventions: 1/25/2025     Zyvox added by ID, appreciate ID recommendations for discharge planning        Continue cefazolin 2 g every 8 hours.     Will continue inpatient monitoring with IV antibiotics for the next 24 to 48 hours will monitor clinical response anticipate discharge likely in the next 48 hours.      Disposition/additional care plan/interventions: 1/26/2025    Improved right extremity cellulitis appreciated.  Bandages removed today redressing by nursing expected later on this morning,anticipate likely discharge in next 24 hours.  Will discuss with infectious disease.    Continue supportive care    The patient was informed of differential diagnosis ,  work up , plan of care and possible sequelae of clinical disposition.Patient in agreement with plan of care. Further recommendations forthcoming in accordance with patient's clinical disposition and response to care.     Discharge planning:Anticipate likely discharge in the next 24 hours.     Care time: > 35 mins              Dictation performed with assistance of voice recognition device therefore transcription errors are possible.4

## 2025-01-26 NOTE — CARE PLAN
Problem: Safety - Adult  Goal: Free from fall injury  Outcome: Progressing     Problem: Discharge Planning  Goal: Discharge to home or other facility with appropriate resources  Outcome: Progressing     Problem: Chronic Conditions and Co-morbidities  Goal: Patient's chronic conditions and co-morbidity symptoms are monitored and maintained or improved  Outcome: Progressing     Problem: Skin  Goal: Decreased wound size/increased tissue granulation at next dressing change  Outcome: Progressing  Flowsheets (Taken 1/25/2025 2356)  Decreased wound size/increased tissue granulation at next dressing change: Promote sleep for wound healing  Goal: Participates in plan/prevention/treatment measures  Outcome: Progressing  Flowsheets (Taken 1/25/2025 2356)  Participates in plan/prevention/treatment measures:   Elevate heels   Increase activity/out of bed for meals  Goal: Prevent/manage excess moisture  Outcome: Progressing  Flowsheets (Taken 1/25/2025 2356)  Prevent/manage excess moisture:   Monitor for/manage infection if present   Follow provider orders for dressing changes  Goal: Prevent/minimize sheer/friction injuries  Outcome: Progressing  Flowsheets (Taken 1/25/2025 2356)  Prevent/minimize sheer/friction injuries:   Increase activity/out of bed for meals   Turn/reposition every 2 hours/use positioning/transfer devices  Goal: Promote/optimize nutrition  Outcome: Progressing  Flowsheets (Taken 1/25/2025 2356)  Promote/optimize nutrition:   Monitor/record intake including meals   Consume > 50% meals/supplements  Goal: Promote skin healing  Outcome: Progressing  Flowsheets (Taken 1/25/2025 2356)  Promote skin healing:   Assess skin/pad under line(s)/device(s)   Turn/reposition every 2 hours/use positioning/transfer devices     Problem: Pain  Goal: Walks with improved pain control throughout the shift  Outcome: Progressing  Goal: Performs ADL's with improved pain control throughout shift  Outcome: Progressing  Goal:  Participates in PT with improved pain control throughout the shift  Outcome: Progressing  Goal: Free from opioid side effects throughout the shift  Outcome: Progressing  Goal: Free from acute confusion related to pain meds throughout the shift  Outcome: Progressing     Problem: Fall/Injury  Goal: Not fall by end of shift  Outcome: Progressing  Goal: Be free from injury by end of the shift  Outcome: Progressing  Goal: Verbalize understanding of personal risk factors for fall in the hospital  Outcome: Progressing  Goal: Verbalize understanding of risk factor reduction measures to prevent injury from fall in the home  Outcome: Progressing  Goal: Use assistive devices by end of the shift  Outcome: Progressing  Goal: Pace activities to prevent fatigue by end of the shift  Outcome: Progressing     Problem: Diabetes  Goal: Maintain glucose levels >70mg/dl to <250mg/dl throughout shift  Outcome: Progressing  Goal: No changes in neurological exam by end of shift  Outcome: Progressing  Goal: Increase self care and/or family involovement by end of shift  Outcome: Progressing

## 2025-01-26 NOTE — PROGRESS NOTES
Mercedez Ellington is a 60 y.o. female on day 5 of admission presenting with Cellulitis of right lower extremity.      Assessment/Plan:     #Right lower extremity cellulitis  #Hx of psoriasis on MTX   Patient only got Keflex 500 mg every 12 hours which likely led to the antibiotic failure. Clinically, does not look like necrotizing fasciitis.  CT scan not concerning for nec fac.  Blood cultures negative for 3 hours.  Cellulitis looks maybe a bit more worse and more warm.  Will add Zyvox     #Concern for drug side effect  Discussed in detail about side effect of antibiotic linezolid including peripheral neuropathy, myelosuppression, serotonin syndrome.  Patient understands the side effects and risks and agrees to take the antibiotic        Hypothyroidism  Diet-controlled diabetes  Endometrial cancer s/p hysterectomy  Obesity      Recommendations:     -Continue cefazolin 2 g q8h (for some gram negative coverage)  -Start Zyvox 600 mg every 12 hour  -If discharge is planned, can switch to cefadroxil 1 g every 12 hours and continue Zyvox 600 mg every 12 hours for 10 more days  -Patient to follow-up with me in the clinic  -Will continue to follow       Mir Marie MD  Date of service: 1/26/2025  Time of service: 4:02 PM      Subjective   Interval History: No acute events overnight.  Patient denies any complaint.        Review of Systems  Denies: fever, chills, nausea, vomiting, diarrhea, dysuria    Objective   Range of Vitals (last 24 hours)  Heart Rate:  [53-61]   Temp:  [35.9 °C (96.6 °F)-36.6 °C (97.8 °F)]   Resp:  [16-18]   BP: (110-144)/(63-84)   SpO2:  [92 %-97 %]  Daily Weight  01/21/25 : 147 kg (325 lb)   Body mass index is 50.9 kg/m².    General: alert, oriented, NAD  HEENT: No conjunctival pallor, no scleral icterus  Neck: No LAD, No JVD  Abdomen: soft, non tender, non distended, BS+  Neuro: AAO x 3, PERRL, CN grossly intact  Extremities: Improvement in the erythema, warmth of the right lower extremity  MSK: No joint  inflammation        Antibiotics  ceFAZolin - 2 gram/100 mL  linezolid - 600 mg      Relevant Results  Labs  Results from last 72 hours   Lab Units 01/25/25  0502 01/24/25  0417   WBC AUTO x10*3/uL 5.3 4.8   HEMOGLOBIN g/dL 11.6* 11.7*   HEMATOCRIT % 36.1 36.9   PLATELETS AUTO x10*3/uL 431 422     Results from last 72 hours   Lab Units 01/24/25  0417   SODIUM mmol/L 137   POTASSIUM mmol/L 3.9   CHLORIDE mmol/L 102   CO2 mmol/L 30   BUN mg/dL 13   CREATININE mg/dL 0.82   GLUCOSE mg/dL 93   CALCIUM mg/dL 8.3*   ANION GAP mmol/L 9*   EGFR mL/min/1.73m*2 81         Estimated Creatinine Clearance: 107.6 mL/min (by C-G formula based on SCr of 0.82 mg/dL).  C-Reactive Protein   Date Value Ref Range Status   01/21/2025 13.49 (H) <1.00 mg/dL Final   07/26/2024 1.84 (H) <1.00 mg/dL Final       Microbiology  Susceptibility data from last 14 days.  Collected Specimen Info Organism   01/22/25 Tissue/Biopsy from Wound/Tissue Mixed Skin Microorganisms        Imaging    CT tibia fibula right wo IV contrast    Result Date: 1/23/2025      Nonspecific right lower leg soft tissue swelling in the superficial soft tissues. There is likely a component of chronic venous stasis as there are numerous tiny soft tissue calcifications. A superimposed component of cellulitis could be present as well.   No evidence of abscess or osteomyelitis.   MACRO: None   Signed by: Sonny Dempsey 1/23/2025 11:25 AM Dictation workstation:   [id number]    CT abdomen pelvis w IV contrast    Result Date: 1/11/2025  Small sliding-type hiatal hernia defect. Likely mild hepatic steatosis. Mild scattered colonic diverticulosis. Signed by Aris Vanegas MD    Vascular US Lower Extremity Venous Duplex Right    Result Date: 1/11/2025  No evidence for DVT within the right lower extremity. Signed by Aris Vanegas MD

## 2025-01-27 ENCOUNTER — APPOINTMENT (OUTPATIENT)
Dept: RADIOLOGY | Facility: HOSPITAL | Age: 60
DRG: 638 | End: 2025-01-27
Payer: COMMERCIAL

## 2025-01-27 LAB
ANION GAP SERPL CALC-SCNC: 10 MMOL/L (ref 10–20)
BUN SERPL-MCNC: 14 MG/DL (ref 6–23)
CALCIUM SERPL-MCNC: 8.6 MG/DL (ref 8.6–10.3)
CHLORIDE SERPL-SCNC: 97 MMOL/L (ref 98–107)
CO2 SERPL-SCNC: 34 MMOL/L (ref 21–32)
CREAT SERPL-MCNC: 0.77 MG/DL (ref 0.5–1.05)
EGFRCR SERPLBLD CKD-EPI 2021: 87 ML/MIN/1.73M*2
ERYTHROCYTE [DISTWIDTH] IN BLOOD BY AUTOMATED COUNT: 16.2 % (ref 11.5–14.5)
GLUCOSE BLD MANUAL STRIP-MCNC: 112 MG/DL (ref 74–99)
GLUCOSE BLD MANUAL STRIP-MCNC: 116 MG/DL (ref 74–99)
GLUCOSE BLD MANUAL STRIP-MCNC: 124 MG/DL (ref 74–99)
GLUCOSE BLD MANUAL STRIP-MCNC: 88 MG/DL (ref 74–99)
GLUCOSE SERPL-MCNC: 82 MG/DL (ref 74–99)
HCT VFR BLD AUTO: 38.3 % (ref 36–46)
HGB BLD-MCNC: 12.2 G/DL (ref 12–16)
MCH RBC QN AUTO: 29.8 PG (ref 26–34)
MCHC RBC AUTO-ENTMCNC: 31.9 G/DL (ref 32–36)
MCV RBC AUTO: 94 FL (ref 80–100)
NRBC BLD-RTO: 0 /100 WBCS (ref 0–0)
PLATELET # BLD AUTO: 514 X10*3/UL (ref 150–450)
POTASSIUM SERPL-SCNC: 4.3 MMOL/L (ref 3.5–5.3)
RBC # BLD AUTO: 4.09 X10*6/UL (ref 4–5.2)
SODIUM SERPL-SCNC: 137 MMOL/L (ref 136–145)
WBC # BLD AUTO: 5.9 X10*3/UL (ref 4.4–11.3)

## 2025-01-27 PROCEDURE — 85027 COMPLETE CBC AUTOMATED: CPT | Performed by: HOSPITALIST

## 2025-01-27 PROCEDURE — 2500000004 HC RX 250 GENERAL PHARMACY W/ HCPCS (ALT 636 FOR OP/ED): Mod: JZ | Performed by: STUDENT IN AN ORGANIZED HEALTH CARE EDUCATION/TRAINING PROGRAM

## 2025-01-27 PROCEDURE — 93970 EXTREMITY STUDY: CPT

## 2025-01-27 PROCEDURE — 36415 COLL VENOUS BLD VENIPUNCTURE: CPT | Performed by: HOSPITALIST

## 2025-01-27 PROCEDURE — 1100000001 HC PRIVATE ROOM DAILY

## 2025-01-27 PROCEDURE — 2500000004 HC RX 250 GENERAL PHARMACY W/ HCPCS (ALT 636 FOR OP/ED): Performed by: STUDENT IN AN ORGANIZED HEALTH CARE EDUCATION/TRAINING PROGRAM

## 2025-01-27 PROCEDURE — 2500000001 HC RX 250 WO HCPCS SELF ADMINISTERED DRUGS (ALT 637 FOR MEDICARE OP): Performed by: STUDENT IN AN ORGANIZED HEALTH CARE EDUCATION/TRAINING PROGRAM

## 2025-01-27 PROCEDURE — 99232 SBSQ HOSP IP/OBS MODERATE 35: CPT | Performed by: HOSPITALIST

## 2025-01-27 PROCEDURE — 80048 BASIC METABOLIC PNL TOTAL CA: CPT | Performed by: HOSPITALIST

## 2025-01-27 PROCEDURE — 82947 ASSAY GLUCOSE BLOOD QUANT: CPT

## 2025-01-27 PROCEDURE — 2500000002 HC RX 250 W HCPCS SELF ADMINISTERED DRUGS (ALT 637 FOR MEDICARE OP, ALT 636 FOR OP/ED): Performed by: STUDENT IN AN ORGANIZED HEALTH CARE EDUCATION/TRAINING PROGRAM

## 2025-01-27 PROCEDURE — 93971 EXTREMITY STUDY: CPT | Performed by: RADIOLOGY

## 2025-01-27 RX ADMIN — FOLIC ACID 1 MG: 1 TABLET ORAL at 05:30

## 2025-01-27 RX ADMIN — CEFAZOLIN SODIUM 2 G: 2 INJECTION, SOLUTION INTRAVENOUS at 21:24

## 2025-01-27 RX ADMIN — CEFAZOLIN SODIUM 2 G: 2 INJECTION, SOLUTION INTRAVENOUS at 12:51

## 2025-01-27 RX ADMIN — ENOXAPARIN SODIUM 60 MG: 60 INJECTION SUBCUTANEOUS at 18:21

## 2025-01-27 RX ADMIN — LINEZOLID 600 MG: 600 TABLET, FILM COATED ORAL at 21:23

## 2025-01-27 RX ADMIN — ENOXAPARIN SODIUM 60 MG: 60 INJECTION SUBCUTANEOUS at 05:28

## 2025-01-27 RX ADMIN — FUROSEMIDE 20 MG: 20 TABLET ORAL at 08:47

## 2025-01-27 RX ADMIN — LEVOTHYROXINE SODIUM 125 MCG: 0.12 TABLET ORAL at 05:28

## 2025-01-27 RX ADMIN — CEFAZOLIN SODIUM 2 G: 2 INJECTION, SOLUTION INTRAVENOUS at 04:44

## 2025-01-27 RX ADMIN — ESCITALOPRAM OXALATE 20 MG: 10 TABLET ORAL at 08:47

## 2025-01-27 RX ADMIN — PANTOPRAZOLE SODIUM 40 MG: 40 TABLET, DELAYED RELEASE ORAL at 05:28

## 2025-01-27 RX ADMIN — LINEZOLID 600 MG: 600 TABLET, FILM COATED ORAL at 08:48

## 2025-01-27 ASSESSMENT — PAIN SCALES - GENERAL
PAINLEVEL_OUTOF10: 0 - NO PAIN
PAINLEVEL_OUTOF10: 0 - NO PAIN

## 2025-01-27 ASSESSMENT — COGNITIVE AND FUNCTIONAL STATUS - GENERAL
MOBILITY SCORE: 24
DAILY ACTIVITIY SCORE: 24
MOBILITY SCORE: 24
MOBILITY SCORE: 24
DAILY ACTIVITIY SCORE: 24
DAILY ACTIVITIY SCORE: 24

## 2025-01-27 ASSESSMENT — PAIN - FUNCTIONAL ASSESSMENT
PAIN_FUNCTIONAL_ASSESSMENT: 0-10
PAIN_FUNCTIONAL_ASSESSMENT: 0-10

## 2025-01-27 NOTE — PROGRESS NOTES
"Nutrition Follow Up Assessment:   Nutrition Assessment       Medical history per chart: 60 y.o. female with PMHx s/f HTN, DLD, T2DM (diet-controlled, last A1c 6.5%), hypothyroidism, psoriasis / psoriatic arthritis (on methotrexate), rosacea, endometrial cancer s/p hysterectomy, obesity, presenting with worsening RLE swelling, discoloration, and skin sloughing. Failure of outpatient treatment noted.   ~ID on consult  ~wound right medial leg x 2; wound care following    1/27: Patient reports nausea has resolved. Reports she is consuming 3 meals per day and is drinking the ensure as well. Since patient noted to meeting calorie needs will switch protein drinks to ensure max BID and recommended to consume in between meals. Offered diet education, but she declined.     Dietary Orders (From admission, onward)       Start     Ordered    01/27/25 1429  Oral nutritional supplements  Until discontinued        Question Answer Comment   Deliver with Breakfast    Deliver with Dinner    Select supplement: Ensure Max Protein        01/27/25 1429    01/21/25 1341  May Participate in Room Service  ( ROOM SERVICE MAY PARTICIPATE)  Once        Question:  .  Answer:  Yes    01/21/25 1340    01/21/25 1331  Adult diet Cardiac; 70 gm fat; 2 - 3 grams Sodium  Diet effective now        Question Answer Comment   Diet type Cardiac    Fat restriction: 70 gm fat    Sodium restriction: 2 - 3 grams Sodium        01/21/25 1333                    Anthropometrics:  Height: 170.2 cm (5' 7\")   Weight: 147 kg (325 lb)   BMI (Calculated): 50.89           Weight History:   Wt Readings from Last 10 Encounters:   01/21/25 147 kg (325 lb)   01/11/25 149 kg (329 lb)   11/25/24 (!) 151 kg (332 lb)   09/25/24 (!) 152 kg (335 lb)   07/26/24 147 kg (325 lb)       Weight Change %:  Weight History / % Weight Change: No significant weight changes noted.    Nutrition Focused Physical Exam Findings:  Subcutaneous Fat Loss:   Orbital Fat Pads: Mild-Moderate (slight " dark circles and slight hollowing)  Buccal Fat Pads: Mild-Moderate (flat cheeks, minimal bounce)  Muscle Wasting:  Temporalis: Well nourished (well-defined muscle)  Pectoralis (Clavicular Region): Well nourished (clavicle not visible)  Edema:  Edema Location: BLE  Physical Findings:  Nails: Positive    Nutrition Significant Labs:  Results from last 7 days   Lab Units 01/27/25  0448 01/24/25  0417 01/23/25  0425 01/22/25  0512   GLUCOSE mg/dL 82 93 113* 91   SODIUM mmol/L 137 137 135* 135*   POTASSIUM mmol/L 4.3 3.9 3.9 4.4   CHLORIDE mmol/L 97* 102 100 100   CO2 mmol/L 34* 30 29 31   BUN mg/dL 14 13 10 12   CREATININE mg/dL 0.77 0.82 0.84 0.87   EGFR mL/min/1.73m*2 87 81 79 75   CALCIUM mg/dL 8.6 8.3* 8.2* 8.3*   MAGNESIUM mg/dL  --   --   --  2.18     Lab Results   Component Value Date    HGBA1C 6.5 (H) 10/16/2024    HGBA1C 6.0 07/25/2023     Results from last 7 days   Lab Units 01/27/25  1134 01/27/25  0634 01/26/25  2034 01/26/25  1646 01/26/25  1248 01/26/25  0651 01/25/25  2114 01/25/25  1634   POCT GLUCOSE mg/dL 124* 88 136* 130* 84 90 121* 123*       Nutrition Specific Medications:  ceFAZolin, 2 g, intravenous, q8h  enoxaparin, 60 mg, subcutaneous, q12h SUSHILA  escitalopram, 20 mg, oral, Daily  folic acid, 1 mg, oral, Daily  furosemide, 20 mg, oral, Daily  insulin lispro, 0-10 Units, subcutaneous, Before meals & nightly  levothyroxine, 125 mcg, oral, Daily  linezolid, 600 mg, oral, q12h SUSHILA  metoprolol succinate XL, 50 mg, oral, Daily  pantoprazole, 40 mg, oral, Daily before breakfast   Or  pantoprazole, 40 mg, intravenous, Daily before breakfast  polyethylene glycol, 17 g, oral, Daily             I/O:   Last BM Date: 01/21/25; Stool Appearance: Loose (01/23/25 1141)    Estimated Needs:   Total Energy Estimated Needs in 24 hours (kCal): 2397 kCal  Method for Estimating Needs: 25kcals/kg ABW  Total Protein Estimated Needs in 24 Hours (g): 96 g  Method for Estimating 24 Hour Protein Needs: 1g/kg ABW  Total Fluid  Estimated Needs in 24 Hours (mL): 2397 mL  Method for Estimating 24 Hour Fluid Needs: 1 ml/kcal or per MD        Nutrition Diagnosis   Malnutrition Diagnosis  Patient has Malnutrition Diagnosis: No    Nutrition Diagnosis  Patient has Nutrition Diagnosis: Yes  Diagnosis Status (1): Active  Nutrition Diagnosis 1: Increased nutrient needs  Related to (1): increased metabolic demand secondary to healing  As Evidenced by (1): Cellulitis       Nutrition Interventions/Recommendations   Nutrition prescription for oral nutrition    Nutrition Recommendations:  Individualized Nutrition Prescription Provided for : Continue current diet. Check ha1c-add 5 carb restriction if needed. Ensure max BID.    Nutrition Interventions/Goals:   Interventions: Meals and snacks, Medical food supplement  Meals and Snacks: Fat-modified diet, Mineral-modified diet  Medical Food Supplement: Commercial beverage medical food supplement therapy  Goal: ensure max BID      Education Documentation  No documentation found.            Nutrition Monitoring and Evaluation   Food/Nutrient Related History Monitoring  Monitoring and Evaluation Plan: Estimated Energy Intake  Estimated Energy Intake: Energy intake greater or equal to 75% of estimated energy needs    Anthropometric Measurements  Monitoring and Evaluation Plan: Body weight  Body Weight: Body weight - Maintain stable weight    Biochemical Data, Medical Tests and Procedures  Monitoring and Evaluation Plan: Electrolyte/renal panel, Glucose/endocrine profile  Electrolyte and Renal Panel: Electrolytes within normal limits  Glucose/Endocrine Profile: Glucose within normal limits ( mg/dL)    Physical Exam Findings  Monitoring and Evaluation Plan: Skin  Skin: Impaired wound healing  Other: promote healing    Goal Status: Goal(s) achieved    Time Spent (min): 30 minutes

## 2025-01-27 NOTE — PROGRESS NOTES
ID is recommending oral atbx for discharge. Discussed with Dr Mcmahon, he is planning to discharge patient today. Discussed that patient will need to follow up at the [facility]. Dr Mcmahon to place referral.

## 2025-01-27 NOTE — CARE PLAN
Problem: Safety - Adult  Goal: Free from fall injury  Outcome: Progressing     Problem: Discharge Planning  Goal: Discharge to home or other facility with appropriate resources  Outcome: Progressing     Problem: Chronic Conditions and Co-morbidities  Goal: Patient's chronic conditions and co-morbidity symptoms are monitored and maintained or improved  Outcome: Progressing     Problem: Skin  Goal: Decreased wound size/increased tissue granulation at next dressing change  Outcome: Progressing  Flowsheets (Taken 1/27/2025 0034)  Decreased wound size/increased tissue granulation at next dressing change: Promote sleep for wound healing  Goal: Participates in plan/prevention/treatment measures  Outcome: Progressing  Flowsheets (Taken 1/27/2025 0034)  Participates in plan/prevention/treatment measures:   Elevate heels   Increase activity/out of bed for meals  Goal: Prevent/manage excess moisture  Outcome: Progressing  Flowsheets (Taken 1/27/2025 0034)  Prevent/manage excess moisture:   Monitor for/manage infection if present   Follow provider orders for dressing changes  Goal: Prevent/minimize sheer/friction injuries  Outcome: Progressing  Flowsheets (Taken 1/27/2025 0034)  Prevent/minimize sheer/friction injuries:   Increase activity/out of bed for meals   Turn/reposition every 2 hours/use positioning/transfer devices  Goal: Promote/optimize nutrition  Outcome: Progressing  Flowsheets (Taken 1/27/2025 0034)  Promote/optimize nutrition:   Monitor/record intake including meals   Consume > 50% meals/supplements  Goal: Promote skin healing  Outcome: Progressing  Flowsheets (Taken 1/27/2025 0034)  Promote skin healing:   Turn/reposition every 2 hours/use positioning/transfer devices   Assess skin/pad under line(s)/device(s)     Problem: Pain  Goal: Walks with improved pain control throughout the shift  Outcome: Progressing  Goal: Performs ADL's with improved pain control throughout shift  Outcome: Progressing  Goal:  Participates in PT with improved pain control throughout the shift  Outcome: Progressing  Goal: Free from opioid side effects throughout the shift  Outcome: Progressing  Goal: Free from acute confusion related to pain meds throughout the shift  Outcome: Progressing     Problem: Fall/Injury  Goal: Not fall by end of shift  Outcome: Progressing  Goal: Be free from injury by end of the shift  Outcome: Progressing  Goal: Verbalize understanding of personal risk factors for fall in the hospital  Outcome: Progressing  Goal: Verbalize understanding of risk factor reduction measures to prevent injury from fall in the home  Outcome: Progressing  Goal: Use assistive devices by end of the shift  Outcome: Progressing  Goal: Pace activities to prevent fatigue by end of the shift  Outcome: Progressing     Problem: Diabetes  Goal: Maintain glucose levels >70mg/dl to <250mg/dl throughout shift  Outcome: Progressing  Goal: No changes in neurological exam by end of shift  Outcome: Progressing  Goal: Increase self care and/or family involovement by end of shift  Outcome: Progressing

## 2025-01-27 NOTE — PROGRESS NOTES
Mercedez Ellington is a 60 y.o. female on day 6 of admission presenting with Cellulitis of right lower extremity.      Assessment/Plan:     #Right lower extremity cellulitis  #Hx of psoriasis on MTX   Patient only got Keflex 500 mg every 12 hours which likely led to the antibiotic failure. Clinically, does not look like necrotizing fasciitis.  CT scan not concerning for nec fac.  Blood cultures negative for 3 hours.  Cellulitis looks maybe a bit more worse and more warm.  Will add Zyvox     #Concern for drug side effect  Discussed in detail about side effect of antibiotic linezolid including peripheral neuropathy, myelosuppression, serotonin syndrome.  Patient understands the side effects and risks and agrees to take the antibiotic        Hypothyroidism  Diet-controlled diabetes  Endometrial cancer s/p hysterectomy  Obesity      Recommendations:     -Continue cefazolin 2 g q8h (for some gram negative coverage)  -Cont Zyvox 600 mg every 12 hour   -Can DC the patient on cefadroxil 1 g every 12 hour and Zyvox 600 every 12 hours through 2/2/2025 for 1 more week  -Patient to follow-up with me in the clinic  -Will continue to follow       Mir Marie MD  Date of service: 1/27/2025  Time of service: 2:50 PM      Subjective   Interval History: No acute events overnight.  Patient denies any new complaint.  States she is somewhat concerned about the antibiotic side effects        Review of Systems  Denies: fever, chills, nausea, vomiting, diarrhea, dysuria    Objective   Range of Vitals (last 24 hours)  Heart Rate:  [56-63]   Temp:  [36.1 °C (97 °F)-36.4 °C (97.6 °F)]   Resp:  [16-18]   BP: (110-127)/(64-70)   SpO2:  [94 %-97 %]  Daily Weight  01/21/25 : 147 kg (325 lb)   Body mass index is 50.9 kg/m².    General: alert, oriented, NAD  HEENT: No conjunctival pallor, no scleral icterus  Neck: No LAD, No JVD  Abdomen: soft, non tender, non distended, BS+  Neuro: AAO x 3, PERRL, CN grossly intact  Extremities: Improvement in the  erythema, warmth of the right lower extremity  MSK: No joint inflammation         Antibiotics  ceFAZolin - 2 gram/100 mL  linezolid - 600 mg      Relevant Results  Labs  Results from last 72 hours   Lab Units 01/27/25  0448 01/25/25  0502   WBC AUTO x10*3/uL 5.9 5.3   HEMOGLOBIN g/dL 12.2 11.6*   HEMATOCRIT % 38.3 36.1   PLATELETS AUTO x10*3/uL 514* 431     Results from last 72 hours   Lab Units 01/27/25  0448   SODIUM mmol/L 137   POTASSIUM mmol/L 4.3   CHLORIDE mmol/L 97*   CO2 mmol/L 34*   BUN mg/dL 14   CREATININE mg/dL 0.77   GLUCOSE mg/dL 82   CALCIUM mg/dL 8.6   ANION GAP mmol/L 10   EGFR mL/min/1.73m*2 87         Estimated Creatinine Clearance: 114.6 mL/min (by C-G formula based on SCr of 0.77 mg/dL).  C-Reactive Protein   Date Value Ref Range Status   01/21/2025 13.49 (H) <1.00 mg/dL Final   07/26/2024 1.84 (H) <1.00 mg/dL Final       Microbiology  Susceptibility data from last 14 days.  Collected Specimen Info Organism   01/22/25 Tissue/Biopsy from Wound/Tissue Mixed Skin Microorganisms        Imaging    CT tibia fibula right wo IV contrast    Result Date: 1/23/2025      Nonspecific right lower leg soft tissue swelling in the superficial soft tissues. There is likely a component of chronic venous stasis as there are numerous tiny soft tissue calcifications. A superimposed component of cellulitis could be present as well.   No evidence of abscess or osteomyelitis.   MACRO: None   Signed by: Sonny Dempsey 1/23/2025 11:25 AM Dictation workstation:   [id number]    CT abdomen pelvis w IV contrast    Result Date: 1/11/2025  Small sliding-type hiatal hernia defect. Likely mild hepatic steatosis. Mild scattered colonic diverticulosis. Signed by Aris Vanegas MD    Vascular US Lower Extremity Venous Duplex Right    Result Date: 1/11/2025  No evidence for DVT within the right lower extremity. Signed by Aris Vanegas MD

## 2025-01-27 NOTE — CARE PLAN
Problem: Safety - Adult  Goal: Free from fall injury  Outcome: Progressing     Problem: Discharge Planning  Goal: Discharge to home or other facility with appropriate resources  Outcome: Progressing     Problem: Chronic Conditions and Co-morbidities  Goal: Patient's chronic conditions and co-morbidity symptoms are monitored and maintained or improved  Outcome: Progressing     Problem: Skin  Goal: Participates in plan/prevention/treatment measures  Outcome: Progressing  Flowsheets (Taken 1/27/2025 1039)  Participates in plan/prevention/treatment measures:   Elevate heels   Increase activity/out of bed for meals  Goal: Prevent/manage excess moisture  Outcome: Progressing  Flowsheets (Taken 1/27/2025 1039)  Prevent/manage excess moisture:   Monitor for/manage infection if present   Moisturize dry skin  Goal: Prevent/minimize sheer/friction injuries  Outcome: Progressing  Flowsheets (Taken 1/27/2025 1039)  Prevent/minimize sheer/friction injuries:   HOB 30 degrees or less   Use pull sheet  Goal: Promote/optimize nutrition  Outcome: Progressing

## 2025-01-28 ENCOUNTER — PHARMACY VISIT (OUTPATIENT)
Dept: PHARMACY | Facility: CLINIC | Age: 60
End: 2025-01-28
Payer: COMMERCIAL

## 2025-01-28 VITALS
HEART RATE: 72 BPM | WEIGHT: 293 LBS | BODY MASS INDEX: 45.99 KG/M2 | SYSTOLIC BLOOD PRESSURE: 124 MMHG | TEMPERATURE: 97.8 F | OXYGEN SATURATION: 92 % | HEIGHT: 67 IN | DIASTOLIC BLOOD PRESSURE: 79 MMHG | RESPIRATION RATE: 18 BRPM

## 2025-01-28 LAB
ANION GAP SERPL CALC-SCNC: 10 MMOL/L (ref 10–20)
BUN SERPL-MCNC: 16 MG/DL (ref 6–23)
CALCIUM SERPL-MCNC: 8.8 MG/DL (ref 8.6–10.3)
CHLORIDE SERPL-SCNC: 98 MMOL/L (ref 98–107)
CO2 SERPL-SCNC: 34 MMOL/L (ref 21–32)
CREAT SERPL-MCNC: 0.76 MG/DL (ref 0.5–1.05)
EGFRCR SERPLBLD CKD-EPI 2021: 89 ML/MIN/1.73M*2
ERYTHROCYTE [DISTWIDTH] IN BLOOD BY AUTOMATED COUNT: 16.4 % (ref 11.5–14.5)
GLUCOSE BLD MANUAL STRIP-MCNC: 110 MG/DL (ref 74–99)
GLUCOSE BLD MANUAL STRIP-MCNC: 97 MG/DL (ref 74–99)
GLUCOSE SERPL-MCNC: 92 MG/DL (ref 74–99)
HCT VFR BLD AUTO: 39 % (ref 36–46)
HGB BLD-MCNC: 12.5 G/DL (ref 12–16)
MCH RBC QN AUTO: 30 PG (ref 26–34)
MCHC RBC AUTO-ENTMCNC: 32.1 G/DL (ref 32–36)
MCV RBC AUTO: 94 FL (ref 80–100)
NRBC BLD-RTO: 0 /100 WBCS (ref 0–0)
PLATELET # BLD AUTO: 540 X10*3/UL (ref 150–450)
POTASSIUM SERPL-SCNC: 4.1 MMOL/L (ref 3.5–5.3)
RBC # BLD AUTO: 4.17 X10*6/UL (ref 4–5.2)
SODIUM SERPL-SCNC: 138 MMOL/L (ref 136–145)
WBC # BLD AUTO: 6.1 X10*3/UL (ref 4.4–11.3)

## 2025-01-28 PROCEDURE — 85027 COMPLETE CBC AUTOMATED: CPT | Performed by: HOSPITALIST

## 2025-01-28 PROCEDURE — 2500000002 HC RX 250 W HCPCS SELF ADMINISTERED DRUGS (ALT 637 FOR MEDICARE OP, ALT 636 FOR OP/ED): Performed by: STUDENT IN AN ORGANIZED HEALTH CARE EDUCATION/TRAINING PROGRAM

## 2025-01-28 PROCEDURE — 82947 ASSAY GLUCOSE BLOOD QUANT: CPT

## 2025-01-28 PROCEDURE — 36415 COLL VENOUS BLD VENIPUNCTURE: CPT | Performed by: HOSPITALIST

## 2025-01-28 PROCEDURE — 2500000001 HC RX 250 WO HCPCS SELF ADMINISTERED DRUGS (ALT 637 FOR MEDICARE OP): Performed by: STUDENT IN AN ORGANIZED HEALTH CARE EDUCATION/TRAINING PROGRAM

## 2025-01-28 PROCEDURE — 2500000004 HC RX 250 GENERAL PHARMACY W/ HCPCS (ALT 636 FOR OP/ED): Performed by: STUDENT IN AN ORGANIZED HEALTH CARE EDUCATION/TRAINING PROGRAM

## 2025-01-28 PROCEDURE — 80048 BASIC METABOLIC PNL TOTAL CA: CPT | Performed by: HOSPITALIST

## 2025-01-28 PROCEDURE — 99239 HOSP IP/OBS DSCHRG MGMT >30: CPT | Performed by: HOSPITALIST

## 2025-01-28 PROCEDURE — RXMED WILLOW AMBULATORY MEDICATION CHARGE

## 2025-01-28 PROCEDURE — 2500000004 HC RX 250 GENERAL PHARMACY W/ HCPCS (ALT 636 FOR OP/ED): Mod: JZ | Performed by: STUDENT IN AN ORGANIZED HEALTH CARE EDUCATION/TRAINING PROGRAM

## 2025-01-28 RX ORDER — CEFADROXIL 500 MG/1
1000 CAPSULE ORAL EVERY 12 HOURS SCHEDULED
Status: DISCONTINUED | OUTPATIENT
Start: 2025-01-28 | End: 2025-01-28 | Stop reason: HOSPADM

## 2025-01-28 RX ORDER — LINEZOLID 600 MG/1
600 TABLET, FILM COATED ORAL EVERY 12 HOURS SCHEDULED
Qty: 14 TABLET | Refills: 0 | Status: SHIPPED | OUTPATIENT
Start: 2025-01-28 | End: 2025-02-04

## 2025-01-28 RX ORDER — CEFADROXIL 1000 MG/1
1 TABLET ORAL 2 TIMES DAILY
Status: DISCONTINUED | OUTPATIENT
Start: 2025-01-28 | End: 2025-01-28 | Stop reason: WASHOUT

## 2025-01-28 RX ORDER — CEFADROXIL 500 MG/1
1 CAPSULE ORAL 2 TIMES DAILY
Qty: 28 CAPSULE | Refills: 0 | Status: SHIPPED | OUTPATIENT
Start: 2025-01-28

## 2025-01-28 RX ADMIN — METOPROLOL SUCCINATE 50 MG: 50 TABLET, EXTENDED RELEASE ORAL at 08:26

## 2025-01-28 RX ADMIN — FOLIC ACID 1 MG: 1 TABLET ORAL at 05:50

## 2025-01-28 RX ADMIN — ESCITALOPRAM OXALATE 20 MG: 10 TABLET ORAL at 08:26

## 2025-01-28 RX ADMIN — LEVOTHYROXINE SODIUM 125 MCG: 0.12 TABLET ORAL at 05:03

## 2025-01-28 RX ADMIN — FUROSEMIDE 20 MG: 20 TABLET ORAL at 08:27

## 2025-01-28 RX ADMIN — CEFAZOLIN SODIUM 2 G: 2 INJECTION, SOLUTION INTRAVENOUS at 05:03

## 2025-01-28 RX ADMIN — LINEZOLID 600 MG: 600 TABLET, FILM COATED ORAL at 08:26

## 2025-01-28 RX ADMIN — ENOXAPARIN SODIUM 60 MG: 60 INJECTION SUBCUTANEOUS at 05:03

## 2025-01-28 RX ADMIN — PANTOPRAZOLE SODIUM 40 MG: 40 TABLET, DELAYED RELEASE ORAL at 06:02

## 2025-01-28 ASSESSMENT — COGNITIVE AND FUNCTIONAL STATUS - GENERAL
STANDING UP FROM CHAIR USING ARMS: A LITTLE
DRESSING REGULAR LOWER BODY CLOTHING: A LITTLE
CLIMB 3 TO 5 STEPS WITH RAILING: A LITTLE
HELP NEEDED FOR BATHING: A LITTLE
TOILETING: A LITTLE
WALKING IN HOSPITAL ROOM: A LITTLE
MOBILITY SCORE: 21
DAILY ACTIVITIY SCORE: 21

## 2025-01-28 NOTE — CARE PLAN
Problem: Safety - Adult  Goal: Free from fall injury  1/27/2025 1941 by Mariajose Cheek RN  Outcome: Progressing  1/27/2025 1039 by Mariajose Cheek RN  Outcome: Progressing     Problem: Discharge Planning  Goal: Discharge to home or other facility with appropriate resources  1/27/2025 1941 by Mariajose Cheek RN  Outcome: Progressing  1/27/2025 1039 by Mariajose Cheek RN  Outcome: Progressing     Problem: Chronic Conditions and Co-morbidities  Goal: Patient's chronic conditions and co-morbidity symptoms are monitored and maintained or improved  1/27/2025 1941 by Mariajose Cheek RN  Outcome: Progressing  1/27/2025 1039 by Mariajose Cheek RN  Outcome: Progressing     Problem: Skin  Goal: Participates in plan/prevention/treatment measures  Outcome: Progressing  Flowsheets (Taken 1/27/2025 1039)  Participates in plan/prevention/treatment measures:   Elevate heels   Increase activity/out of bed for meals  Goal: Prevent/manage excess moisture  Outcome: Progressing  Flowsheets (Taken 1/27/2025 1039)  Prevent/manage excess moisture:   Monitor for/manage infection if present   Moisturize dry skin  Goal: Prevent/minimize sheer/friction injuries  Outcome: Progressing  Flowsheets (Taken 1/27/2025 1039)  Prevent/minimize sheer/friction injuries:   HOB 30 degrees or less   Use pull sheet  Goal: Promote/optimize nutrition  Outcome: Progressing     Problem: Pain  Goal: Walks with improved pain control throughout the shift  Outcome: Progressing  Goal: Performs ADL's with improved pain control throughout shift  Outcome: Progressing     Problem: Fall/Injury  Goal: Not fall by end of shift  Outcome: Progressing  Goal: Be free from injury by end of the shift  Outcome: Progressing

## 2025-01-28 NOTE — PROGRESS NOTES
Mercedez Ellington is a 60 y.o. female on day 7 of admission presenting with Cellulitis of right lower extremity.      Assessment/Plan:     #Right lower extremity cellulitis  #Hx of psoriasis on MTX   Patient only got Keflex 500 mg every 12 hours which likely led to the antibiotic failure. Clinically, does not look like necrotizing fasciitis.  CT scan not concerning for nec fac.  Blood cultures negative for 3 hours.  Cellulitis looks maybe a bit more worse and more warm.  Will add Zyvox     #Concern for drug side effect  Discussed in detail about side effect of antibiotic linezolid including peripheral neuropathy, myelosuppression, serotonin syndrome.  Patient understands the side effects and risks and agrees to take the antibiotic        Hypothyroidism  Diet-controlled diabetes  Endometrial cancer s/p hysterectomy  Obesity      Recommendations:     -Continue cefazolin 2 g q8h (for some gram negative coverage)  -Cont Zyvox 600 mg every 12 hour   -Can DC the patient on cefadroxil 1 g every 12 hour and Zyvox 600 every 12 hours through 2/2/2025 for 1 more week  -Will continue to follow       Mir Marie MD  Date of service: 1/28/2025  Time of service: 12:40 PM      Subjective   Interval History: No acute events overnight.  Patient denies any new complaint.  Pending discharge today.        Review of Systems  Denies: fever, chills, nausea, vomiting, diarrhea, dysuria    Objective   Range of Vitals (last 24 hours)  Heart Rate:  [61-87]   Temp:  [36.1 °C (97 °F)-36.6 °C (97.8 °F)]   Resp:  [16-19]   BP: (103-145)/(60-79)   SpO2:  [90 %-94 %]  Daily Weight  01/21/25 : 147 kg (325 lb)   Body mass index is 50.9 kg/m².    General: alert, oriented, NAD  HEENT: No conjunctival pallor, no scleral icterus  Neck: No LAD, No JVD  Abdomen: soft, non tender, non distended, BS+  Neuro: AAO x 3, PERRL, CN grossly intact  Extremities: RLE clean dressing  MSK: No joint inflammation        Antibiotics  cefadroxil - 1 gram  ceFAZolin - 2  gram/100 mL  linezolid - 600 mg      Relevant Results  Labs  Results from last 72 hours   Lab Units 01/28/25  0514 01/27/25  0448   WBC AUTO x10*3/uL 6.1 5.9   HEMOGLOBIN g/dL 12.5 12.2   HEMATOCRIT % 39.0 38.3   PLATELETS AUTO x10*3/uL 540* 514*     Results from last 72 hours   Lab Units 01/28/25  0514 01/27/25  0448   SODIUM mmol/L 138 137   POTASSIUM mmol/L 4.1 4.3   CHLORIDE mmol/L 98 97*   CO2 mmol/L 34* 34*   BUN mg/dL 16 14   CREATININE mg/dL 0.76 0.77   GLUCOSE mg/dL 92 82   CALCIUM mg/dL 8.8 8.6   ANION GAP mmol/L 10 10   EGFR mL/min/1.73m*2 89 87         Estimated Creatinine Clearance: 116.1 mL/min (by C-G formula based on SCr of 0.76 mg/dL).  C-Reactive Protein   Date Value Ref Range Status   01/21/2025 13.49 (H) <1.00 mg/dL Final   07/26/2024 1.84 (H) <1.00 mg/dL Final       Microbiology  Susceptibility data from last 14 days.  Collected Specimen Info Organism   01/22/25 Tissue/Biopsy from Wound/Tissue Mixed Skin Microorganisms        Imaging    Vascular US lower extremity venous duplex bilateral    Result Date: 1/27/2025  No sign of acute deep venous thrombosis in the lower extremities.     MACRO: none   Signed by: Marti Pinedo 1/27/2025 8:29 PM Dictation workstation:   [id number]    CT tibia fibula right wo IV contrast    Result Date: 1/23/2025      Nonspecific right lower leg soft tissue swelling in the superficial soft tissues. There is likely a component of chronic venous stasis as there are numerous tiny soft tissue calcifications. A superimposed component of cellulitis could be present as well.   No evidence of abscess or osteomyelitis.   MACRO: None   Signed by: Sonny Dempsey 1/23/2025 11:25 AM Dictation workstation:   [id number]    CT abdomen pelvis w IV contrast    Result Date: 1/11/2025  Small sliding-type hiatal hernia defect. Likely mild hepatic steatosis. Mild scattered colonic diverticulosis. Signed by Aris Vanegas MD    Vascular US Lower Extremity Venous Duplex Right    Result Date:  1/11/2025  No evidence for DVT within the right lower extremity. Signed by Aris Vanegas MD

## 2025-01-28 NOTE — DISCHARGE SUMMARY
Discharge Summary    Mercedez Ellington    [id number]     1/21/2025  9:44 AM , admit date    1/28/2025,discharge date      .      Discharge Diagnosis:        1.  Severe right lower extremity extensive cellulitis with failure of outpatient therapy/treatment.    2.  Diabetes mellitus, continue diabetic diet, close follow-up with family physician recommended, continue monitor hemoglobin A1c levels.    3.  Mild hyponatremia    4.  Psoriasis    5.  Endometrial cancer history    6.  Hypothyroidism    7.  Class III obesity, safe weight loss recommended    Problem List Items Addressed This Visit             ICD-10-CM       Infectious Diseases    * (Principal) Cellulitis of right lower extremity - Primary L03.115    Relevant Orders    Vascular US lower extremity venous duplex bilateral (Completed)          Hospital Course/Progress note on the date of  discharge.    Mercedez Ellington [id number]   Service: Internal Medicine / Hospitalist Date of service: 1/27/2025                                  Full Code                           Subjective     History Of Present Illness (HPI):  Mercedez Ellington is a 60 y.o. female with PMHx s/f HTN, DLD, T2DM (diet-controlled, last A1c 6.5%), hypothyroidism, psoriasis / psoriatic arthritis (on methotrexate), rosacea, endometrial cancer s/p hysterectomy, obesity, presenting with worsening RLE swelling, discoloration, and skin sloughing. Pt was initially seen in the ED 01/15/25 for RLE swelling and groin pain, was diagnosed with UTI and RLE cellulitis (had a negative DVT US). She was discharged on Keflex and Bactrim. Since discharge, she had been doing fairly well and was experiencing improvement in the burning pain she had initially had in the RLE since prior to start of abx. She had also been wrapping her RLE and applying a topical 3-antibiotic ointment. She reports that she did miss two days of changing the dressing and when her daughter came to assist with the dressing change, it was noted that she had  worsening of the redness and more blistering / skin peeling than she had originally had. It was also more swollen compared to prior. She reports no fevers since the original day she came to the ED (Tmax 101.7 at home on 01/10/25). She has chronic chills but that is unchanged. She reports resolution of UTI symptoms. She has a cold sore on the lower portion of the R side of her mouth but denies any skin changes (outside of chronic psoriatic patches) anywhere else on her body or mouth. Her appetite has been present but slightly diminished from baseline. Denies cp/pressure, palpitations, diaphoresis, SOB, HURST, dizziness / lightheadedness, syncope or near syncope, HA, vision changes, f/n/v/d/abd pain. She denies hx VTE. She has been able to place weight on the RLE and denies issues with ROM of the knee / ankle. Notably does have history of bilateral knee replacements 2/2 OA.      ED Course (Summary - please note all labs, imaging studies, and interventions noted below have been personally reviewed and/or interpreted on day of admission):   Vitals on presentation: T96.3, /64, HR 71, RR 18, SpO2 96% RA  Labs: CBC with WBC 8.2, Hgb 13.2, platelets 441.  CMP with glucose 98, sodium 135, potassium 3.8, BUN 10, serum creatinine 0.84.  Lactate 1.1.  I ordered add on CRP (13.49), ESR (108).  Blood cultures x 2 were collected in the ED and are in process.  Imaging: none completed day of admission   Neg DVT US from 01/11/25   Interventions: Vancomycin/Ancef 2 g, admitted to medicine     1/22................No reported: Neck pain, chest pains, nausea, vomiting, fevers or chills.No increasing leg pain reported by patient.     1/23..................The patient reported overall she felt a bit better today.  No reported :palpitations, headaches, fevers, chills, nausea, vomiting or increasing right leg pain.     1/24...................Patient seen and examined with her son at the bedside.  Patient endorsed nausea no other  complaints voiced by patient today.  No reported abdominal pain, emesis, chest pains, fevers, chills, nausea or vomiting.     1/25....................Patient reported feeling better today.  Still reported some nausea but no associated abdominal pain, constipation, emesis, fevers, chills or palpitations.She reported neuropathy in her lower extremities.     1/26......................Patient reported no acute complaints apart from some occasional itching of the right lower extremity. No reported: Chest pains, palpitations, nausea, chills.     1/27...................No reported: Fevers, chills, headaches, chest pains, nausea or vomiting.     1/28.....................Patient seen and examined this morning the presence of her .  No reported fevers, chills, headaches, chest pains, nausea or vomiting.        Review of Systems:   Review of system otherwise negative if not aforementioned above in subjective.     Objective        Physical Exam      Constitutional:       Appearance: Patient appeared in no acute cardiopulmonary distress.     Comments: Patient alert and oriented to person place time and situation.  HEENT:      Head: Normocephalic and atraumatic.Trachea midline      Nose:No observed congestion or rhinorrhea.     Mouth/Throat: Mucous membranes Moist, Trachea appeared  midline.  Eyes:      Extraocular Movements: Extraocular movements intact.      Pupils: Pupils are equal, round, and reactive to light.      Comments: No scleral icterus or conjunctival injection appreciated.   Cardiovascular:      Rate and Rhythm: Normal rate and regular rhythm. No clicks rubs or gallops, normal S1 and S2.No peripheral stigmata of endocarditis appreciated.     Pulmonary:      Lungs appeared clear to auscultation, no adventitious sound appreciated.  Abdominal:      General: Abdomen soft, nontender, active bowel sounds, no involuntary guarding or rebound tenderness appreciated.     Comments: None   Musculoskeletal:       Patient  appeared to have full active range of motion for upper and lower extremities, no acute apparent joint deformity appreciated on examination.   No pitting edema or cyanosis appreciated.       Lymphadenopathy:      No appreciable palpable lymphadenopathy  Skin:     General: Skin is warm.      Coloration:  No jaundice     Findings: Right lower extremity cellulitis, with interval improvement in comparison to date of presentation.  However still significant cellulitis appreciated right leg swelling.     Neurological:      General: No focal sensory or motor deficits appreciated, no meningeal signs or dysmetria noted.      Cranial Nerves: Cranial nerves II to XII appearing grossly intact.     Genitals:  Deferred  Psychiatric:         The patient appears to be displaying normal mood and affect at the time of evaluation.     Labs:                  Lab Results                   Lab Results   Component Value Date     GLUCOSE 93 01/24/2025     CALCIUM 8.3 (L) 01/24/2025      01/24/2025     K 3.9 01/24/2025     CO2 30 01/24/2025      01/24/2025     BUN 13 01/24/2025     CREATININE 0.82 01/24/2025                Lab Results   Component Value Date     WBC 5.3 01/25/2025     HGB 11.6 (L) 01/25/2025     HCT 36.1 01/25/2025     MCV 94 01/25/2025      01/25/2025         [unfilled]   [unfilled]   Susceptibility data from last 90 days.  Collected Specimen Info Organism Ampicillin Cefazolin Cefazolin (uncomplicated UTIs only) Ciprofloxacin Gentamicin Nitrofurantoin Piperacillin/Tazobactam Trimethoprim/Sulfamethoxazole   01/11/25 Urine from Clean Catch/Voided Escherichia coli  S  S  S  S  S  S  S  S                                                   Revision History             Medical Problems         Problem List         * (Principal) Cellulitis of right lower extremity     Hypertension, essential     Hypothyroidism, acquired     Moderate episode of recurrent major depressive disorder     Primary localized  osteoarthritis of right knee     Arthritis of knee     Endometrial cancer (Multi)                  Above medical problems may be reflective of historical medical problems that may have resolved and may not related to acute clinical condition/medical problems.     Clinical impression/plan:        60 y.o. female with PMHx s/f HTN, DLD, T2DM (diet-controlled, last A1c 6.5%), hypothyroidism, psoriasis / psoriatic arthritis (on methotrexate), rosacea, endometrial cancer s/p hysterectomy, obesity, presenting with worsening RLE swelling, discoloration, and skin sloughing.      Worsening RLE Cellulitis and Skin Sloughing   Failure of outpatient treatment   Immunocompromised patient  Elevated inflammatory markers (ESR, CRP)   -Patient does not meet sepsis criteria on admission   -Source: as above  -Imaging: CT RLE ordered   --DVT US negative on 01/11/25 --> will hold off on repeat for now given significant skin sloughing   -Abx allergies: PCN + Cipro (Hives and Rash to both, confirmed on admission)  -Lactate 1.1   -BP is normotensive   -Blood cultures x2 in process   -Follow fever curve, WBCs  -Continue antibiotic coverage with vanc/cefepime/flagyl until seen by ID   -(+) monitor for sxs of toxicity and/or rxn related to vancomycin; appreciate pharmacy assistance with management   -ID consultation appreciated   -Patient has intact pulses and good sensation to the RLE      Recent UTI   -Sxs have resolved   -Has completed tx with Keflex   -UA ordered in ED, pending      Hyponatremia, mild, chronic   -Na baseline in the lower 130s  -Na 135 on admit     HTN, DLD  -BP: controlled on presentation, home therapies will be continued. Close monitoring and adjust as needed.   -DLD: will be continued on home therapies   -Continued outpatient follow-up as scheduled      DM-II   -Optimize glucose control on admission with current infection   -Continue with SSI ACHS   -Accucheks, hypoglycemic protocol   -Monitor and adjust as needed        Hypothyroidism   -Continue home synthroid      Psoriasis / psoriatic arthritis   -Continue home folic acid   -Takes methotrexate on Sundays      Endometrial cancer s/p hysterectomy  -Continue follow-up as scheduled      Morbid Obesity (BMI 50.90)   -Evidenced by BMI as stated above, complicating all aspects of care including increased utilization of hospital resources   -Continued follow-up with PCP and healthy dietary + lifestyle changes as able       Diet: Cardiac, carb controlled   DVT Prophylaxis: Lovenox, ambulate as tolerate (avoid SCDs to RLE)  Code Status: Full Code                        /     Will continue inpatient treatment, still appreciation of significant cellulitis on examination today.  Patient agreement with continued inpatient therapy at this time patient herself not comfortable with discharge home today.     Will repeat Doppler ultrasound of her right lower extremity for completeness given degree of swelling and pain with ambulation.  Erythema has improved.     Continue IV antibiotics while inpatient transition to oral antibiotics at time of discharge  Continue supportive care  Continue Cefazolin and Zyvox   Pansensitive E. coli noted in urine: 20,000 - 80,000 CFU/mL Escherichia coli covered by current antimicrobial therapy  No evidence of abscess or osteomyelitis on ct of right  leg.      Antibiotic stewardship as per ID/de-escalation as per ID           The patient was informed of differential diagnosis , work up , plan of care and possible sequelae of clinical disposition.Patient in agreement with plan of care. Further recommendations forthcoming in accordance with patient's clinical disposition and response to care.     Discharge planning/ 1/28/2025       Plan transition to oral antibiotic therapy today.     Infectious disease recommend discharge     Discharge planning discussed with patient and patient's  at bedside.     Patient agreement with discharge.     Follow-up with wound  care clinic recommended.     Patient should seek medical attention immediately if condition should worsen, new symptoms develop , no further improvement or recurrence of presenting symptomatology, patient warned.           Dictation performed with assistance of voice recognition device therefore transcription errors are possible             Consultants    Infectious disease.           Surgeries/Procedures:    None               Your medication list        ASK your doctor about these medications        Instructions Last Dose Given Next Dose Due   celecoxib 200 mg capsule  Commonly known as: CeleBREX      Take 1 capsule (200 mg) by mouth once daily.       cephalexin 500 mg capsule  Commonly known as: Keflex  Ask about: Should I take this medication?      Take 1 capsule (500 mg) by mouth 2 times a day for 10 days.       escitalopram 20 mg tablet  Commonly known as: Lexapro           folic acid 1 mg tablet  Commonly known as: Folvite      Take 1 tablet (1 mg) by mouth early in the morning..       furosemide 20 mg tablet  Commonly known as: Lasix           levothyroxine 125 mcg tablet  Commonly known as: Synthroid, Levoxyl           methotrexate 2.5 mg tablet  Commonly known as: Trexall      Take 6 tablets (15 mg total) by mouth 1 (one) time per week.       metoprolol succinate XL 50 mg 24 hr tablet  Commonly known as: Toprol-XL           sulfamethoxazole-trimethoprim 800-160 mg tablet  Commonly known as: Bactrim DS  Ask about: Should I take this medication?      Take 1 tablet by mouth 2 times a day for 10 days.                   DispositionAdditional Hospital course/events;    60-year-old  female who presented with severe extensive right lower extremity cellulitis with notable history of psoriasis on methotrexate therapy.  CT scan was performed of the right lower extremity to rule out possible necrotizing fasciitis.  Severe cellulitis was appreciated without appreciation of bone involvement.  Patient was  followed inpatient by infectious disease and condition of clinical improvement with supportive interventions implemented.  Patient was later continued on Ancef and Zyvox during hospital course.  Patient was transition to oral cefadroxil 1 g every 12 hour on the date of discharge as recommended by infectious disease.  Will continue additional week of antibiotic therapy orally.    Overall patient hospital course appears uneventful patient responded well supportive interventions implemented Doppler ultrasound of the right lower extremity negative for deep vein thrombosis.    Patient should seek medical attention immediately if condition should worsen, new symptoms develop , no further improvement or recurrence of presenting symptomatology, patient warned.    Follow-up:    Follow -up with family physician within one week. Follow-up with infectious disease recommended.  Follow-up with wound care clinic recommended, referral sent      Discharge Time : 32 mins      Patient should seek medical attention immediately if condition should worsen , presenting symptoms/conditions do not resolve or new symptoms should developed,patient warned.     Dictation performed with assistance of voice recognition device therefore transcription errors are possible.

## 2025-01-28 NOTE — NURSING NOTE
Discharge instructions given with good understanding. [facility] RX to be delivered from pharmacy. Patient ride will be arriving at 2pm.

## 2025-01-28 NOTE — PROGRESS NOTES
Mercedez Ellington [id number]   Service: Internal Medicine / Hospitalist Date of service: 1/27/2025                                  Full Code                           Subjective     History Of Present Illness (HPI):  Mercedez Ellington is a 60 y.o. female with PMHx s/f HTN, DLD, T2DM (diet-controlled, last A1c 6.5%), hypothyroidism, psoriasis / psoriatic arthritis (on methotrexate), rosacea, endometrial cancer s/p hysterectomy, obesity, presenting with worsening RLE swelling, discoloration, and skin sloughing. Pt was initially seen in the ED 01/15/25 for RLE swelling and groin pain, was diagnosed with UTI and RLE cellulitis (had a negative DVT US). She was discharged on Keflex and Bactrim. Since discharge, she had been doing fairly well and was experiencing improvement in the burning pain she had initially had in the RLE since prior to start of abx. She had also been wrapping her RLE and applying a topical 3-antibiotic ointment. She reports that she did miss two days of changing the dressing and when her daughter came to assist with the dressing change, it was noted that she had worsening of the redness and more blistering / skin peeling than she had originally had. It was also more swollen compared to prior. She reports no fevers since the original day she came to the ED (Tmax 101.7 at home on 01/10/25). She has chronic chills but that is unchanged. She reports resolution of UTI symptoms. She has a cold sore on the lower portion of the R side of her mouth but denies any skin changes (outside of chronic psoriatic patches) anywhere else on her body or mouth. Her appetite has been present but slightly diminished from baseline. Denies cp/pressure, palpitations, diaphoresis, SOB, HURST, dizziness / lightheadedness, syncope or near syncope, HA, vision changes, f/n/v/d/abd pain. She denies hx VTE. She has been able to place weight on the RLE and denies issues with ROM of the knee / ankle. Notably does have history of bilateral knee  replacements 2/2 OA.      ED Course (Summary - please note all labs, imaging studies, and interventions noted below have been personally reviewed and/or interpreted on day of admission):   Vitals on presentation: T96.3, /64, HR 71, RR 18, SpO2 96% RA  Labs: CBC with WBC 8.2, Hgb 13.2, platelets 441.  CMP with glucose 98, sodium 135, potassium 3.8, BUN 10, serum creatinine 0.84.  Lactate 1.1.  I ordered add on CRP (13.49), ESR (108).  Blood cultures x 2 were collected in the ED and are in process.  Imaging: none completed day of admission   Neg DVT US from 01/11/25   Interventions: Vancomycin/Ancef 2 g, admitted to medicine     1/22................No reported: Neck pain, chest pains, nausea, vomiting, fevers or chills.No increasing leg pain reported by patient.     1/23..................The patient reported overall she felt a bit better today.  No reported :palpitations, headaches, fevers, chills, nausea, vomiting or increasing right leg pain.     1/24...................Patient seen and examined with her son at the bedside.  Patient endorsed nausea no other complaints voiced by patient today.  No reported abdominal pain, emesis, chest pains, fevers, chills, nausea or vomiting.     1/25....................Patient reported feeling better today.  Still reported some nausea but no associated abdominal pain, constipation, emesis, fevers, chills or palpitations.She reported neuropathy in her lower extremities.     1/26......................Patient reported no acute complaints apart from some occasional itching of the right lower extremity. No reported: Chest pains, palpitations, nausea, chills.     1/27...................No reported: Fevers, chills, headaches, chest pains, nausea or vomiting.    1/28.....................Patient seen and examined this morning the presence of her .  No reported fevers, chills, headaches, chest pains, nausea or vomiting.        Review of Systems:   Review of system otherwise  negative if not aforementioned above in subjective.     Objective        Physical Exam      Constitutional:       Appearance: Patient appeared in no acute cardiopulmonary distress.     Comments: Patient alert and oriented to person place time and situation.  HEENT:      Head: Normocephalic and atraumatic.Trachea midline      Nose:No observed congestion or rhinorrhea.     Mouth/Throat: Mucous membranes Moist, Trachea appeared  midline.  Eyes:      Extraocular Movements: Extraocular movements intact.      Pupils: Pupils are equal, round, and reactive to light.      Comments: No scleral icterus or conjunctival injection appreciated.   Cardiovascular:      Rate and Rhythm: Normal rate and regular rhythm. No clicks rubs or gallops, normal S1 and S2.No peripheral stigmata of endocarditis appreciated.     Pulmonary:      Lungs appeared clear to auscultation, no adventitious sound appreciated.  Abdominal:      General: Abdomen soft, nontender, active bowel sounds, no involuntary guarding or rebound tenderness appreciated.     Comments: None   Musculoskeletal:       Patient appeared to have full active range of motion for upper and lower extremities, no acute apparent joint deformity appreciated on examination.   No pitting edema or cyanosis appreciated.       Lymphadenopathy:      No appreciable palpable lymphadenopathy  Skin:     General: Skin is warm.      Coloration:  No jaundice     Findings: Right lower extremity cellulitis, with interval improvement in comparison to date of presentation.  However still significant cellulitis appreciated right leg swelling.     Neurological:      General: No focal sensory or motor deficits appreciated, no meningeal signs or dysmetria noted.      Cranial Nerves: Cranial nerves II to XII appearing grossly intact.     Genitals:  Deferred  Psychiatric:         The patient appears to be displaying normal mood and affect at the time of evaluation.     Labs:                  Lab Results                    Lab Results   Component Value Date     GLUCOSE 93 01/24/2025     CALCIUM 8.3 (L) 01/24/2025      01/24/2025     K 3.9 01/24/2025     CO2 30 01/24/2025      01/24/2025     BUN 13 01/24/2025     CREATININE 0.82 01/24/2025                Lab Results   Component Value Date     WBC 5.3 01/25/2025     HGB 11.6 (L) 01/25/2025     HCT 36.1 01/25/2025     MCV 94 01/25/2025      01/25/2025         [unfilled]   [unfilled]   Susceptibility data from last 90 days.  Collected Specimen Info Organism Ampicillin Cefazolin Cefazolin (uncomplicated UTIs only) Ciprofloxacin Gentamicin Nitrofurantoin Piperacillin/Tazobactam Trimethoprim/Sulfamethoxazole   01/11/25 Urine from Clean Catch/Voided Escherichia coli  S  S  S  S  S  S  S  S                                                   Revision History             Medical Problems         Problem List         * (Principal) Cellulitis of right lower extremity     Hypertension, essential     Hypothyroidism, acquired     Moderate episode of recurrent major depressive disorder     Primary localized osteoarthritis of right knee     Arthritis of knee     Endometrial cancer (Multi)                  Above medical problems may be reflective of historical medical problems that may have resolved and may not related to acute clinical condition/medical problems.     Clinical impression/plan:        60 y.o. female with PMHx s/f HTN, DLD, T2DM (diet-controlled, last A1c 6.5%), hypothyroidism, psoriasis / psoriatic arthritis (on methotrexate), rosacea, endometrial cancer s/p hysterectomy, obesity, presenting with worsening RLE swelling, discoloration, and skin sloughing.      Worsening RLE Cellulitis and Skin Sloughing   Failure of outpatient treatment   Immunocompromised patient  Elevated inflammatory markers (ESR, CRP)   -Patient does not meet sepsis criteria on admission   -Source: as above  -Imaging: CT RLE ordered   --DVT US negative on 01/11/25 --> will  hold off on repeat for now given significant skin sloughing   -Abx allergies: PCN + Cipro (Hives and Rash to both, confirmed on admission)  -Lactate 1.1   -BP is normotensive   -Blood cultures x2 in process   -Follow fever curve, WBCs  -Continue antibiotic coverage with vanc/cefepime/flagyl until seen by ID   -(+) monitor for sxs of toxicity and/or rxn related to vancomycin; appreciate pharmacy assistance with management   -ID consultation appreciated   -Patient has intact pulses and good sensation to the RLE      Recent UTI   -Sxs have resolved   -Has completed tx with Keflex   -UA ordered in ED, pending      Hyponatremia, mild, chronic   -Na baseline in the lower 130s  -Na 135 on admit     HTN, DLD  -BP: controlled on presentation, home therapies will be continued. Close monitoring and adjust as needed.   -DLD: will be continued on home therapies   -Continued outpatient follow-up as scheduled      DM-II   -Optimize glucose control on admission with current infection   -Continue with SSI ACHS   -Accucheks, hypoglycemic protocol   -Monitor and adjust as needed       Hypothyroidism   -Continue home synthroid      Psoriasis / psoriatic arthritis   -Continue home folic acid   -Takes methotrexate on Sundays      Endometrial cancer s/p hysterectomy  -Continue follow-up as scheduled      Morbid Obesity (BMI 50.90)   -Evidenced by BMI as stated above, complicating all aspects of care including increased utilization of hospital resources   -Continued follow-up with PCP and healthy dietary + lifestyle changes as able       Diet: Cardiac, carb controlled   DVT Prophylaxis: Lovenox, ambulate as tolerate (avoid SCDs to RLE)  Code Status: Full Code                        /     Will continue inpatient treatment, still appreciation of significant cellulitis on examination today.  Patient agreement with continued inpatient therapy at this time patient herself not comfortable with discharge home today.     Will repeat Doppler  ultrasound of her right lower extremity for completeness given degree of swelling and pain with ambulation.  Erythema has improved.     Continue IV antibiotics while inpatient transition to oral antibiotics at time of discharge  Continue supportive care  Continue Cefazolin and Zyvox   Pansensitive E. coli noted in urine: 20,000 - 80,000 CFU/mL Escherichia coli covered by current antimicrobial therapy  No evidence of abscess or osteomyelitis on ct of right  leg.      Antibiotic stewardship as per ID/de-escalation as per ID           The patient was informed of differential diagnosis , work up , plan of care and possible sequelae of clinical disposition.Patient in agreement with plan of care. Further recommendations forthcoming in accordance with patient's clinical disposition and response to care.     Discharge planning/ 1/28/2025       Plan transition to oral antibiotic therapy today.    Infectious disease recommend discharge    Discharge planning discussed with patient and patient's  at bedside.    Patient agreement with discharge.    Follow-up with wound care clinic recommended.    Patient should seek medical attention immediately if condition should worsen, new symptoms develop , no further improvement or recurrence of presenting symptomatology, patient warned.         Dictation performed with assistance of voice recognition device therefore transcription errors are possible

## 2025-01-28 NOTE — NURSING NOTE
"During hourly rounding patient noted to have 4 side rails up. Patient was educated that it is considered a restraint, however patient \"prefer it that way because it makes me feel safe while sleeping\".    Patient ambulates to bathroom and is able to adjust the side rails independently.  "

## 2025-01-29 NOTE — NURSING NOTE
Patient discharged via wc at this time. Belongings in hand and Rx received from pharmacy. Wound supplies given to hold patient until seen by wound center on Thursday.

## 2025-01-30 ENCOUNTER — OFFICE VISIT (OUTPATIENT)
Dept: WOUND CARE | Facility: CLINIC | Age: 60
End: 2025-01-30
Payer: COMMERCIAL

## 2025-01-30 PROCEDURE — 99213 OFFICE O/P EST LOW 20 MIN: CPT

## 2025-01-30 PROCEDURE — 11042 DBRDMT SUBQ TIS 1ST 20SQCM/<: CPT

## 2025-02-06 ENCOUNTER — OFFICE VISIT (OUTPATIENT)
Dept: WOUND CARE | Facility: CLINIC | Age: 60
End: 2025-02-06
Payer: COMMERCIAL

## 2025-02-06 PROCEDURE — 99212 OFFICE O/P EST SF 10 MIN: CPT

## 2025-02-13 ENCOUNTER — APPOINTMENT (OUTPATIENT)
Dept: WOUND CARE | Facility: CLINIC | Age: 60
End: 2025-02-13
Payer: COMMERCIAL

## 2025-02-17 ENCOUNTER — OFFICE VISIT (OUTPATIENT)
Dept: INFECTIOUS DISEASES | Facility: HOSPITAL | Age: 60
End: 2025-02-17
Payer: COMMERCIAL

## 2025-02-17 VITALS
TEMPERATURE: 98.6 F | RESPIRATION RATE: 20 BRPM | DIASTOLIC BLOOD PRESSURE: 72 MMHG | SYSTOLIC BLOOD PRESSURE: 155 MMHG | HEART RATE: 64 BPM | OXYGEN SATURATION: 93 %

## 2025-02-17 DIAGNOSIS — L03.115 CELLULITIS OF RIGHT LOWER EXTREMITY: Primary | ICD-10-CM

## 2025-02-17 RX ORDER — ESCITALOPRAM OXALATE 10 MG/1
10 TABLET ORAL DAILY
COMMUNITY

## 2025-02-17 ASSESSMENT — COLUMBIA-SUICIDE SEVERITY RATING SCALE - C-SSRS
2. HAVE YOU ACTUALLY HAD ANY THOUGHTS OF KILLING YOURSELF?: NO
6. HAVE YOU EVER DONE ANYTHING, STARTED TO DO ANYTHING, OR PREPARED TO DO ANYTHING TO END YOUR LIFE?: NO
1. IN THE PAST MONTH, HAVE YOU WISHED YOU WERE DEAD OR WISHED YOU COULD GO TO SLEEP AND NOT WAKE UP?: NO

## 2025-02-17 ASSESSMENT — ENCOUNTER SYMPTOMS
DEPRESSION: 0
LOSS OF SENSATION IN FEET: 0
OCCASIONAL FEELINGS OF UNSTEADINESS: 0

## 2025-02-17 ASSESSMENT — PATIENT HEALTH QUESTIONNAIRE - PHQ9
1. LITTLE INTEREST OR PLEASURE IN DOING THINGS: NOT AT ALL
2. FEELING DOWN, DEPRESSED OR HOPELESS: NOT AT ALL
SUM OF ALL RESPONSES TO PHQ9 QUESTIONS 1 AND 2: 0

## 2025-02-17 ASSESSMENT — PAIN SCALES - GENERAL: PAINLEVEL_OUTOF10: 3

## 2025-02-17 NOTE — LETTER
02/17/25    Joellen Sanon MD  [address] [place] [id number]      Dear Dr. Joellen Sanon MD,    I am writing to confirm that your patient, Mercedez Ellington, received care in my office on 02/17/25. I have enclosed a summary of the care provided to Mercedez for your reference.    Please contact me with any questions you may have regarding the visit.    Sincerely,         Mir Marie MD  [address] [facility] [address]  [facility] [address] [phone]    CC: No Recipients

## 2025-02-17 NOTE — PROGRESS NOTES
Infectious Diseases Clinic Follow Up Note:        Follow Up Reason:       Assessment/Plan:     #Right lower extremity cellulitis  #Hx of psoriasis on MTX   Patient only got Keflex 500 mg every 12 hours which likely led to the antibiotic failure. Clinically, does not look like necrotizing fasciitis.  CT scan not concerning for nec fac.  Blood cultures negative for 3 hours.  S/p 2 weeks of abx    Recommendations:  -S/p zyvox and cefadroxil  -No more abx needed  -Follow up as need\ed      Mir Marie MD  Date of service: 2/17/2025  Time of service: 3:14 PM      History Of Present Illness  Mercedez Ellington is a 60 y.o. female with PMHx of Anxiety, depression, HTN, hypothyroidism, psoriasis presented to the hospital with concern for right lower extremity cellulitis.  Patient was recently in the ED for phlebitis on 1/11 and was prescribed Bactrim and Keflex.  Patient states that her symptoms were slowly improving.  She had been put triple antibiotic ointment on the skin and wrapped her legs and did not open the dressing for next 2 days.  When the dressing was open, patient felt like the infection has gotten worse and came to the ED for further evaluation.    Patient initially got cefepime, vanc, flagyl and was then swithced to ancef. On ancef monotherapy, lesions worsened and zyvox was started. Isaiah was then dced on cefadroxil and zyvox for total of 2 weeks           Medications  Home medications:  (Not in a hospital admission)        Review of Systems     Constitutional:  Denies appetite change, chills, fatigue, fever.  HENT:  Denies ear discharge, sore throat    Eyes:  Denies photophobia, eye drainage  Respiratory:  Denies cough, chest tightness, SOB  Cardiovascular:  Denies chest pain, palpitations  Gastrointestinal:  Denies abdominal pain, diarrhea, nausea, vomiting.   Genitourinary:  Denies dysuria, flank pain, frequency  Musculoskeletal:  Denies joint pain  Skin:  Reports improving skin lesion RLE  Neurological:  " Denies light-headedness, numbness and headaches.    Objective  Range of Vitals (last 24 hours)  [unfilled]  Daily Weight  01/21/25 : 147 kg (325 lb)    There is no height or weight on file to calculate BMI.     Physical Exam  /72 (BP Location: Right arm, Patient Position: Sitting)   Pulse 64   Temp 37 °C (98.6 °F)   Resp 20   SpO2 93%   @TMAX(24)@      General: alert, oriented, NAD  HEENT: No conjunctival pallor, no scleral icterus  Neck: No LAD, No JVD  Lungs: bilaterally clear to auscultation, no wheezing  Heart: regular rate and rhythm, no murmur  Abdomen: soft, non tender, non distended, BS+  Neuro: AAO x 3, PERRL, CN grossly intact  Extremities: Significant improvement in the RLE swelling and erythema  Skin: RLE stasis dermatitis  MSK: No joint inflammation     Relevant Results    Labs              CrCl cannot be calculated (Patient's most recent lab result is older than the maximum 7 days allowed.).  C-Reactive Protein   Date Value Ref Range Status   01/21/2025 13.49 (H) <1.00 mg/dL Final   07/26/2024 1.84 (H) <1.00 mg/dL Final     Sedimentation Rate   Date Value Ref Range Status   01/21/2025 108 (H) 0 - 30 mm/h Final   07/26/2024 41 (H) 0 - 30 mm/h Final     No results found for: \"HIV1X2\", \"HIVCONF\", \"UNGFBL4JS\"  Hepatitis C AB   Date Value Ref Range Status   07/26/2024 Nonreactive Nonreactive Final     Comment:     Results from patients taking biotin supplements or receiving high-dose biotin therapy should be interpreted with caution due to possible interference with this test. Providers may contact their local laboratory for further information.       Microbiology  No results found for the last 14 days.      Imaging  Vascular US lower extremity venous duplex bilateral    Result Date: 1/27/2025  No sign of acute deep venous thrombosis in the lower extremities.     MACRO: none   Signed by: Marti Pinedo 1/27/2025 8:29 PM Dictation workstation:   [id number]    CT tibia fibula right wo IV " contrast    Result Date: 1/23/2025      Nonspecific right lower leg soft tissue swelling in the superficial soft tissues. There is likely a component of chronic venous stasis as there are numerous tiny soft tissue calcifications. A superimposed component of cellulitis could be present as well.   No evidence of abscess or osteomyelitis.   MACRO: None   Signed by: Sonny Dempsey 1/23/2025 11:25 AM Dictation workstation:   [id number]

## 2025-02-18 DIAGNOSIS — L40.50 PSORIATIC ARTHRITIS (MULTI): ICD-10-CM

## 2025-02-18 RX ORDER — CELECOXIB 200 MG/1
200 CAPSULE ORAL DAILY
Qty: 30 CAPSULE | Refills: 0 | Status: SHIPPED | OUTPATIENT
Start: 2025-02-18 | End: 2025-08-17

## 2025-02-24 ENCOUNTER — APPOINTMENT (OUTPATIENT)
Dept: RHEUMATOLOGY | Facility: CLINIC | Age: 60
End: 2025-02-24
Payer: COMMERCIAL

## 2025-02-24 VITALS
DIASTOLIC BLOOD PRESSURE: 80 MMHG | BODY MASS INDEX: 45.99 KG/M2 | SYSTOLIC BLOOD PRESSURE: 132 MMHG | HEART RATE: 78 BPM | WEIGHT: 293 LBS | HEIGHT: 67 IN

## 2025-02-24 DIAGNOSIS — L40.50 PSORIATIC ARTHRITIS (MULTI): Primary | ICD-10-CM

## 2025-02-24 PROCEDURE — 3075F SYST BP GE 130 - 139MM HG: CPT | Performed by: INTERNAL MEDICINE

## 2025-02-24 PROCEDURE — 3008F BODY MASS INDEX DOCD: CPT | Performed by: INTERNAL MEDICINE

## 2025-02-24 PROCEDURE — 99213 OFFICE O/P EST LOW 20 MIN: CPT | Performed by: INTERNAL MEDICINE

## 2025-02-24 PROCEDURE — 3079F DIAST BP 80-89 MM HG: CPT | Performed by: INTERNAL MEDICINE

## 2025-02-24 PROCEDURE — 1036F TOBACCO NON-USER: CPT | Performed by: INTERNAL MEDICINE

## 2025-02-24 RX ORDER — METHOTREXATE 2.5 MG/1
15 TABLET ORAL WEEKLY
Qty: 72 TABLET | Refills: 0 | Status: SHIPPED | OUTPATIENT
Start: 2025-02-24 | End: 2025-05-25

## 2025-02-24 RX ORDER — CELECOXIB 200 MG/1
200 CAPSULE ORAL DAILY
Qty: 90 CAPSULE | Refills: 0 | Status: SHIPPED | OUTPATIENT
Start: 2025-02-24 | End: 2025-05-25

## 2025-02-24 RX ORDER — METHOTREXATE 2.5 MG/1
15 TABLET ORAL WEEKLY
COMMUNITY
End: 2025-02-24 | Stop reason: SDUPTHER

## 2025-02-24 ASSESSMENT — PAIN SCALES - GENERAL: PAINLEVEL_OUTOF10: 0-NO PAIN

## 2025-02-24 NOTE — PROGRESS NOTES
"Subjective . Mercedez Ellington is a 60 y.o. female who presents for Follow-up (3 month follow up).    HPI. 60-year-old female with history of psoriasis, psoriatic arthritis, rosacea, HTN, hypothyroidism, obesity, endometrial CA s/p hysterectomy, arthritis s/p bilateral TKR and s/p right carpal tunnel decompression surgery presented for follow-up.     She was hospitalized last month with right lower extremity cellulitis.  She took last dose of methotrexate on December 19.  She reports pain and swelling of the right middle and left index finger.  She also notes psoriatic rashes coming back and involving the hands.    Immunosuppression: Methotrexate.(7/26/2024).      X-ray of the hands: (11/2024).  Left hand: Redemonstration of mild arthritic changes of the 1st carpometacarpal  joint.     Right hand: Moderately advanced arthritis of the DIP joints of the 2nd, 3rd and  5th digits bilaterally.Redemonstration of arthritic changes of the 1st carpometacarpal  joints right side more severe than the left.    Review of Systems   All other systems reviewed and are negative.    Objective     Blood pressure 132/80, pulse 78, height 1.702 m (5' 7\"), weight 148 kg (327 lb).    Physical Exam.  Gen. AAO x3, NAD.  HEENT: No pallor or icterus, PERRLA, EOMI. No cervical lymphadenopathy .  Skin: Small psoriatic rash at the dorsum of right hand.  Right lower extremity skin changes secondary to chronic venous insufficiency.   Heart: S1, S2/ RRR.   Lungs: CTA B.  Abdomen: Soft, NT/ND.  MSK: Mild tenderness of the right middle finger PIP joint and left index finger PIP joint.    Neuro: Sensation to touch intact.Strength 5/5 throughout.  Psych:Appropriate mood and behavior  EXT: 1+ ankle edema.        Assessment/Plan .    #1: Psoriatic arthritis.  She also has features of OA.  Responded well to methotrexate however it was on hold due to right lower extremity cellulitis.  She has chronic venous insufficiency and edema of the lower extremities.  She " uses compression stockings.  She is on diuretics.    -Continue Celebrex.  -Resume methotrexate.  Side effects including the risk of infection discussed in length again.  -Resume folic acid 1 mg daily.    Follow-up in 3 months.     This note was partially generated using the Dragon Voice recognition system. There may be some incorrect wording, spelling and/or spelling errors or punctuation errors that were not corrected prior to committing the note to the medical record.          Problem List Items Addressed This Visit    None  Visit Diagnoses       Psoriatic arthritis (Multi)    -  Primary    Relevant Medications    methotrexate (Trexall) 2.5 mg tablet    celecoxib (CeleBREX) 200 mg capsule                 Active Ambulatory Problems     Diagnosis Date Noted    Cellulitis of right lower extremity 01/21/2025    Hypertension, essential 03/30/2018    Hypothyroidism, acquired 07/12/2021    Moderate episode of recurrent major depressive disorder 08/17/2021    Primary localized osteoarthritis of right knee 03/29/2023    Arthritis of knee 03/06/2023    Endometrial cancer (Multi) 07/09/2020     Resolved Ambulatory Problems     Diagnosis Date Noted    No Resolved Ambulatory Problems     Past Medical History:   Diagnosis Date    Anxiety and depression     Endometrial carcinoma (Multi)     Hypertension     Hypothyroidism     Psoriasis        Family History   Problem Relation Name Age of Onset    Hypertension Mother      Diabetes Father         Past Surgical History:   Procedure Laterality Date    CARPAL TUNNEL RELEASE      CHOLECYSTECTOMY      HYSTERECTOMY      KNEE ARTHROPLASTY         Social History     Tobacco Use   Smoking Status Never   Smokeless Tobacco Never       Allergies  Morphine (pf), Ciprofloxacin, and Penicillin    Current Meds  Current Outpatient Medications   Medication Instructions    celecoxib (CELEBREX) 200 mg, oral, Daily    escitalopram (LEXAPRO) 10 mg, Daily    folic acid (FOLVITE) 1 mg, oral, Daily (0630)     furosemide (LASIX) 20 mg, Daily    levothyroxine (SYNTHROID, LEVOXYL) 125 mcg, Daily    methotrexate (TREXALL) 15 mg, oral, Weekly, Follow directions carefully, and ask to explain any part you do not understand. Take exactly as directed.    metoprolol succinate XL (TOPROL-XL) 50 mg, Daily        Lab Results   Component Value Date    RF <10 07/26/2024    SEDRATE 108 (H) 01/21/2025    CRP 13.49 (H) 01/21/2025    URICACID 6.0 07/26/2024             Dev Davidson MD

## 2025-03-22 DIAGNOSIS — L40.50 PSORIATIC ARTHRITIS (MULTI): ICD-10-CM

## 2025-03-23 DIAGNOSIS — L40.50 PSORIATIC ARTHRITIS (MULTI): ICD-10-CM

## 2025-03-24 RX ORDER — CELECOXIB 200 MG/1
200 CAPSULE ORAL DAILY
Qty: 30 CAPSULE | Refills: 3 | Status: SHIPPED | OUTPATIENT
Start: 2025-03-24 | End: 2025-06-22

## 2025-03-24 RX ORDER — METHOTREXATE 2.5 MG/1
TABLET ORAL
Qty: 72 TABLET | Refills: 1 | Status: SHIPPED | OUTPATIENT
Start: 2025-03-24

## 2025-05-27 ENCOUNTER — APPOINTMENT (OUTPATIENT)
Dept: RHEUMATOLOGY | Facility: CLINIC | Age: 60
End: 2025-05-27
Payer: COMMERCIAL

## 2025-06-14 NOTE — PROGRESS NOTES
Mercedez Ellington [id number]   Service: Internal Medicine / Hospitalist Date of service: 1/27/2025                                  Full Code                           Subjective     History Of Present Illness (HPI):  Mercedez Ellington is a 60 y.o. female with PMHx s/f HTN, DLD, T2DM (diet-controlled, last A1c 6.5%), hypothyroidism, psoriasis / psoriatic arthritis (on methotrexate), rosacea, endometrial cancer s/p hysterectomy, obesity, presenting with worsening RLE swelling, discoloration, and skin sloughing. Pt was initially seen in the ED 01/15/25 for RLE swelling and groin pain, was diagnosed with UTI and RLE cellulitis (had a negative DVT US). She was discharged on Keflex and Bactrim. Since discharge, she had been doing fairly well and was experiencing improvement in the burning pain she had initially had in the RLE since prior to start of abx. She had also been wrapping her RLE and applying a topical 3-antibiotic ointment. She reports that she did miss two days of changing the dressing and when her daughter came to assist with the dressing change, it was noted that she had worsening of the redness and more blistering / skin peeling than she had originally had. It was also more swollen compared to prior. She reports no fevers since the original day she came to the ED (Tmax 101.7 at home on 01/10/25). She has chronic chills but that is unchanged. She reports resolution of UTI symptoms. She has a cold sore on the lower portion of the R side of her mouth but denies any skin changes (outside of chronic psoriatic patches) anywhere else on her body or mouth. Her appetite has been present but slightly diminished from baseline. Denies cp/pressure, palpitations, diaphoresis, SOB, HURST, dizziness / lightheadedness, syncope or near syncope, HA, vision changes, f/n/v/d/abd pain. She denies hx VTE. She has been able to place weight on the RLE and denies issues with ROM of the knee / ankle. Notably does have history of bilateral knee  [facility]    Consult                        Today's Date: 6/14/2025  Patient Name: Hemanth Barrera  Date of admission: 6/12/2025 12:57 PM  Patient's age: 88 y.o., 1/13/1937  Admission Dx: Weakness [R53.1]  General weakness [R53.1]  VARSHA (acute kidney injury) [N17.9]    Reason for Consult:  Cardiac evaluation    Subjective:  Remains with lower BP and orthostatic  No cp  V paced on monitor      Current Facility-Administered Medications:     lidocaine 4 % external patch 1 patch, 1 patch, TransDERmal, Daily, Andre Quezada MD    diclofenac sodium (VOLTAREN) 1 % gel 4 g, 4 g, Topical, BID, Andre Quezada MD    midodrine (PROAMATINE) tablet 15 mg, 15 mg, Oral, TID WC, Kwaku, Josh, DO, 15 mg at 06/14/25 0938    tamsulosin (FLOMAX) capsule 0.4 mg, 0.4 mg, Oral, Daily, Andre Quezada MD, 0.4 mg at 06/14/25 0938    sodium chloride flush 0.9 % injection 5-40 mL, 5-40 mL, IntraVENous, 2 times per day, Andre Quezada MD, 10 mL at 06/13/25 2135    sodium chloride flush 0.9 % injection 5-40 mL, 5-40 mL, IntraVENous, PRN, Andre Quezada MD    0.9 % sodium chloride infusion, , IntraVENous, PRN, Andre Quezada MD    ondansetron (ZOFRAN-ODT) disintegrating tablet 4 mg, 4 mg, Oral, Q8H PRN **OR** ondansetron (ZOFRAN) injection 4 mg, 4 mg, IntraVENous, Q6H PRN, Andre Quezada MD, 4 mg at 06/14/25 0936    polyethylene glycol (GLYCOLAX) packet 17 g, 17 g, Oral, Daily PRN, Andre Quezada MD    acetaminophen (TYLENOL) tablet 650 mg, 650 mg, Oral, Q6H PRN **OR** acetaminophen (TYLENOL) suppository 650 mg, 650 mg, Rectal, Q6H PRN, Andre Quezada MD    atorvastatin (LIPITOR) tablet 40 mg, 40 mg, Oral, Nightly, Andre Quezada MD, 40 mg at 06/13/25 8380    [Held by provider] bumetanide (BUMEX) tablet 1 mg, 1 mg, Oral, Daily, Andre Quezada MD    clopidogrel (PLAVIX) tablet 75 mg, 75 mg, Oral, Daily, Andre Quezada MD, 75 mg at 06/14/25 0759     replacements 2/2 OA.      ED Course (Summary - please note all labs, imaging studies, and interventions noted below have been personally reviewed and/or interpreted on day of admission):   Vitals on presentation: T96.3, /64, HR 71, RR 18, SpO2 96% RA  Labs: CBC with WBC 8.2, Hgb 13.2, platelets 441.  CMP with glucose 98, sodium 135, potassium 3.8, BUN 10, serum creatinine 0.84.  Lactate 1.1.  I ordered add on CRP (13.49), ESR (108).  Blood cultures x 2 were collected in the ED and are in process.  Imaging: none completed day of admission   Neg DVT US from 01/11/25   Interventions: Vancomycin/Ancef 2 g, admitted to medicine     1/22................No reported: Neck pain, chest pains, nausea, vomiting, fevers or chills.No increasing leg pain reported by patient.     1/23..................The patient reported overall she felt a bit better today.  No reported :palpitations, headaches, fevers, chills, nausea, vomiting or increasing right leg pain.     1/24...................Patient seen and examined with her son at the bedside.  Patient endorsed nausea no other complaints voiced by patient today.  No reported abdominal pain, emesis, chest pains, fevers, chills, nausea or vomiting.     1/25....................Patient reported feeling better today.  Still reported some nausea but no associated abdominal pain, constipation, emesis, fevers, chills or palpitations.She reported neuropathy in her lower extremities.     1/26......................Patient reported no acute complaints apart from some occasional itching of the right lower extremity. No reported: Chest pains, palpitations, nausea, chills.    1/27...................No reported: Fevers, chills, headaches, chest pains, nausea or vomiting.        Review of Systems:   Review of system otherwise negative if not aforementioned above in subjective.     Objective        Physical Exam      Constitutional:       Appearance: Patient appeared in no acute cardiopulmonary  distress.     Comments: Patient alert and oriented to person place time and situation.  HEENT:      Head: Normocephalic and atraumatic.Trachea midline      Nose:No observed congestion or rhinorrhea.     Mouth/Throat: Mucous membranes Moist, Trachea appeared  midline.  Eyes:      Extraocular Movements: Extraocular movements intact.      Pupils: Pupils are equal, round, and reactive to light.      Comments: No scleral icterus or conjunctival injection appreciated.   Cardiovascular:      Rate and Rhythm: Normal rate and regular rhythm. No clicks rubs or gallops, normal S1 and S2.No peripheral stigmata of endocarditis appreciated.     Pulmonary:      Lungs appeared clear to auscultation, no adventitious sound appreciated.  Abdominal:      General: Abdomen soft, nontender, active bowel sounds, no involuntary guarding or rebound tenderness appreciated.     Comments: None   Musculoskeletal:       Patient appeared to have full active range of motion for upper and lower extremities, no acute apparent joint deformity appreciated on examination.   No pitting edema or cyanosis appreciated.       Lymphadenopathy:      No appreciable palpable lymphadenopathy  Skin:     General: Skin is warm.      Coloration:  No jaundice     Findings: Right lower extremity cellulitis, with interval improvement in comparison to date of presentation.  However still significant cellulitis appreciated right leg swelling.     Neurological:      General: No focal sensory or motor deficits appreciated, no meningeal signs or dysmetria noted.      Cranial Nerves: Cranial nerves II to XII appearing grossly intact.     Genitals:  Deferred  Psychiatric:         The patient appears to be displaying normal mood and affect at the time of evaluation.     Labs:                  Lab Results               Lab Results   Component Value Date     GLUCOSE 93 01/24/2025     CALCIUM 8.3 (L) 01/24/2025      01/24/2025     K 3.9 01/24/2025     CO2 30 01/24/2025       01/24/2025     BUN 13 01/24/2025     CREATININE 0.82 01/24/2025            Lab Results   Component Value Date     WBC 5.3 01/25/2025     HGB 11.6 (L) 01/25/2025     HCT 36.1 01/25/2025     MCV 94 01/25/2025      01/25/2025         [unfilled]   [unfilled]   Susceptibility data from last 90 days.  Collected Specimen Info Organism Ampicillin Cefazolin Cefazolin (uncomplicated UTIs only) Ciprofloxacin Gentamicin Nitrofurantoin Piperacillin/Tazobactam Trimethoprim/Sulfamethoxazole   01/11/25 Urine from Clean Catch/Voided Escherichia coli  S  S  S  S  S  S  S  S                                                   Revision History             Medical Problems         Problem List         * (Principal) Cellulitis of right lower extremity     Hypertension, essential     Hypothyroidism, acquired     Moderate episode of recurrent major depressive disorder     Primary localized osteoarthritis of right knee     Arthritis of knee     Endometrial cancer (Multi)                  Above medical problems may be reflective of historical medical problems that may have resolved and may not related to acute clinical condition/medical problems.     Clinical impression/plan:        60 y.o. female with PMHx s/f HTN, DLD, T2DM (diet-controlled, last A1c 6.5%), hypothyroidism, psoriasis / psoriatic arthritis (on methotrexate), rosacea, endometrial cancer s/p hysterectomy, obesity, presenting with worsening RLE swelling, discoloration, and skin sloughing.      Worsening RLE Cellulitis and Skin Sloughing   Failure of outpatient treatment   Immunocompromised patient  Elevated inflammatory markers (ESR, CRP)   -Patient does not meet sepsis criteria on admission   -Source: as above  -Imaging: CT RLE ordered   --DVT US negative on 01/11/25 --> will hold off on repeat for now given significant skin sloughing   -Abx allergies: PCN + Cipro (Hives and Rash to both, confirmed on admission)  -Lactate 1.1   -BP is normotensive  Mid left circumflex artery has a 70% stenosis, we will do staged PCI if patient is symptomatic.  Otherwise we will prefer antianginal medications    Patient loaded with Plavix, aspirin, continue aspirin 81 mg daily and Plavix 75 mg daily.    As per patient he is allergic to aspirin, the allergy is nausea, patient counseled that he needs aspirin for now.    Continue Angiomax infusion until it finished    No family to discuss the heart  cath finding and report.    Left groin access closed with Angio-Seal     Recommendations     Patient management should include: Aggressive risk factor modification, continued aggressive risk factor modification, aggressive medical management and optimal medical management.       Labs:     CBC:   Recent Labs     06/13/25  0657 06/14/25  0743   WBC 3.1* 3.4*   HGB 10.1* 10.8*   HCT 31.1* 33.9*    193     BMP:   Recent Labs     06/13/25  0657 06/14/25  0743    139   K 4.5 4.4   CO2 25 26   BUN 40* 32*   CREATININE 1.8* 1.2   LABGLOM 35* 57*   GLUCOSE 96 100*     BNP: No results for input(s): \"BNP\" in the last 72 hours.  PT/INR:   Recent Labs     06/12/25  1313   PROTIME 25.4*   INR 2.1     APTT:  Recent Labs     06/12/25  1313   APTT 33.6     CARDIAC ENZYMES:No results for input(s): \"CKTOTAL\", \"CKMB\", \"CKMBINDEX\", \"TROPONINI\" in the last 72 hours.  FASTING LIPID PANEL:  Lab Results   Component Value Date/Time    HDL 50 09/16/2024 03:04 PM    TRIG 41 09/16/2024 03:04 PM     LIVER PROFILE:No results for input(s): \"AST\", \"ALT\", \"LABALBU\" in the last 72 hours.    06/12/25    ECHO (TTE) COMPLETE (PRN CONTRAST/BUBBLE/STRAIN/3D) 06/13/2025  3:11 PM (Final)    Interpretation Summary    Left Ventricle: Normal left ventricular systolic function with a visually estimated EF of 55 - 60%. Left ventricle is smaller than normal. Mildly increased wall thickness. Normal wall motion. E/E' Ratio (Averaged) is 6.13.    Right Ventricle: Right ventricle size is normal. Lead present in the right    -Blood cultures x2 in process   -Follow fever curve, WBCs  -Continue antibiotic coverage with vanc/cefepime/flagyl until seen by ID   -(+) monitor for sxs of toxicity and/or rxn related to vancomycin; appreciate pharmacy assistance with management   -ID consultation appreciated   -Patient has intact pulses and good sensation to the RLE      Recent UTI   -Sxs have resolved   -Has completed tx with Keflex   -UA ordered in ED, pending      Hyponatremia, mild, chronic   -Na baseline in the lower 130s  -Na 135 on admit     HTN, DLD  -BP: controlled on presentation, home therapies will be continued. Close monitoring and adjust as needed.   -DLD: will be continued on home therapies   -Continued outpatient follow-up as scheduled      DM-II   -Optimize glucose control on admission with current infection   -Continue with SSI ACHS   -Accucheks, hypoglycemic protocol   -Monitor and adjust as needed       Hypothyroidism   -Continue home synthroid      Psoriasis / psoriatic arthritis   -Continue home folic acid   -Takes methotrexate on Sundays      Endometrial cancer s/p hysterectomy  -Continue follow-up as scheduled      Morbid Obesity (BMI 50.90)   -Evidenced by BMI as stated above, complicating all aspects of care including increased utilization of hospital resources   -Continued follow-up with PCP and healthy dietary + lifestyle changes as able       Diet: Cardiac, carb controlled   DVT Prophylaxis: Lovenox, ambulate as tolerate (avoid SCDs to RLE)  Code Status: Full Code                       Disposition/additional care plan/interventions: 1/27/2025     Will continue inpatient treatment, still appreciation of significant cellulitis on examination today.  Patient agreement with continued inpatient therapy at this time patient herself not comfortable with discharge home today.    Will repeat Doppler ultrasound of her right lower extremity for completeness given degree of swelling and pain with ambulation.  Erythema has  improved.    Continue IV antibiotics while inpatient transition to oral antibiotics at time of discharge  Continue supportive care  Continue Cefazolin and Zyvox   Pansensitive E. coli noted in urine: 20,000 - 80,000 CFU/mL Escherichia coli covered by current antimicrobial therapy  No evidence of abscess or osteomyelitis on ct of right  leg.      Antibiotic stewardship as per ID/de-escalation as per ID        The patient was informed of differential diagnosis , work up , plan of care and possible sequelae of clinical disposition.Patient in agreement with plan of care. Further recommendations forthcoming in accordance with patient's clinical disposition and response to care.     Discharge planning:Anticipate likely discharge in the next 24 hours.     Care time: > 35 mins              Dictation performed with assistance of voice recognition device therefore transcription errors are possible

## 2025-08-12 ENCOUNTER — APPOINTMENT (OUTPATIENT)
Dept: CARDIOLOGY | Facility: HOSPITAL | Age: 60
DRG: 871 | End: 2025-08-12
Payer: COMMERCIAL

## 2025-08-12 ENCOUNTER — HOSPITAL ENCOUNTER (INPATIENT)
Facility: HOSPITAL | Age: 60
DRG: 871 | End: 2025-08-12
Admitting: STUDENT IN AN ORGANIZED HEALTH CARE EDUCATION/TRAINING PROGRAM
Payer: COMMERCIAL

## 2025-08-12 ENCOUNTER — APPOINTMENT (OUTPATIENT)
Dept: RADIOLOGY | Facility: HOSPITAL | Age: 60
DRG: 871 | End: 2025-08-12
Payer: COMMERCIAL

## 2025-08-12 SDOH — SOCIAL STABILITY: SOCIAL INSECURITY: WERE YOU ABLE TO COMPLETE ALL THE BEHAVIORAL HEALTH SCREENINGS?: YES

## 2025-08-12 SDOH — SOCIAL STABILITY: SOCIAL INSECURITY: HAVE YOU HAD THOUGHTS OF HARMING ANYONE ELSE?: NO

## 2025-08-12 SDOH — SOCIAL STABILITY: SOCIAL INSECURITY: ABUSE: ADULT

## 2025-08-12 ASSESSMENT — ACTIVITIES OF DAILY LIVING (ADL)
HEARING - RIGHT EAR: FUNCTIONAL
GROOMING: INDEPENDENT
TOILETING: INDEPENDENT
HEARING - LEFT EAR: FUNCTIONAL
PATIENT'S MEMORY ADEQUATE TO SAFELY COMPLETE DAILY ACTIVITIES?: YES
JUDGMENT_ADEQUATE_SAFELY_COMPLETE_DAILY_ACTIVITIES: YES
DRESSING YOURSELF: INDEPENDENT
ADEQUATE_TO_COMPLETE_ADL: YES
BATHING: INDEPENDENT
WALKS IN HOME: INDEPENDENT
FEEDING YOURSELF: INDEPENDENT

## 2025-08-12 ASSESSMENT — LIFESTYLE VARIABLES
AUDIT-C TOTAL SCORE: 0
EVER HAD A DRINK FIRST THING IN THE MORNING TO STEADY YOUR NERVES TO GET RID OF A HANGOVER: NO
TOTAL SCORE: 0
HOW MANY STANDARD DRINKS CONTAINING ALCOHOL DO YOU HAVE ON A TYPICAL DAY: PATIENT DOES NOT DRINK
HOW OFTEN DO YOU HAVE 6 OR MORE DRINKS ON ONE OCCASION: NEVER
HAVE PEOPLE ANNOYED YOU BY CRITICIZING YOUR DRINKING: NO
SKIP TO QUESTIONS 9-10: 1
HAVE YOU EVER FELT YOU SHOULD CUT DOWN ON YOUR DRINKING: NO
HOW OFTEN DO YOU HAVE A DRINK CONTAINING ALCOHOL: NEVER
EVER FELT BAD OR GUILTY ABOUT YOUR DRINKING: NO
AUDIT-C TOTAL SCORE: 0

## 2025-08-12 ASSESSMENT — COGNITIVE AND FUNCTIONAL STATUS - GENERAL
DAILY ACTIVITIY SCORE: 24
MOBILITY SCORE: 24
PATIENT BASELINE BEDBOUND: NO

## 2025-08-12 ASSESSMENT — PAIN - FUNCTIONAL ASSESSMENT: PAIN_FUNCTIONAL_ASSESSMENT: 0-10

## 2025-08-12 ASSESSMENT — PAIN SCALES - GENERAL: PAINLEVEL_OUTOF10: 0 - NO PAIN

## 2025-08-13 PROBLEM — A41.9 SEVERE SEPSIS: Status: ACTIVE | Noted: 2025-08-13

## 2025-08-13 PROBLEM — R41.82 ALTERED MENTAL STATUS, UNSPECIFIED ALTERED MENTAL STATUS TYPE: Status: ACTIVE | Noted: 2025-08-13

## 2025-08-13 PROBLEM — R65.20 SEVERE SEPSIS: Status: ACTIVE | Noted: 2025-08-13

## 2025-08-13 PROBLEM — E11.9 TYPE 2 DIABETES MELLITUS WITHOUT COMPLICATION, WITHOUT LONG-TERM CURRENT USE OF INSULIN: Status: ACTIVE | Noted: 2025-08-13

## 2025-08-13 SDOH — SOCIAL STABILITY: SOCIAL INSECURITY: WITHIN THE LAST YEAR, HAVE YOU BEEN HUMILIATED OR EMOTIONALLY ABUSED IN OTHER WAYS BY YOUR PARTNER OR EX-PARTNER?: NO

## 2025-08-13 SDOH — ECONOMIC STABILITY: HOUSING INSECURITY: IN THE PAST 12 MONTHS, HOW MANY TIMES HAVE YOU MOVED WHERE YOU WERE LIVING?: 0

## 2025-08-13 SDOH — ECONOMIC STABILITY: INCOME INSECURITY: IN THE PAST 12 MONTHS HAS THE ELECTRIC, GAS, OIL, OR WATER COMPANY THREATENED TO SHUT OFF SERVICES IN YOUR HOME?: NO

## 2025-08-13 SDOH — ECONOMIC STABILITY: HOUSING INSECURITY: IN THE LAST 12 MONTHS, WAS THERE A TIME WHEN YOU WERE NOT ABLE TO PAY THE MORTGAGE OR RENT ON TIME?: NO

## 2025-08-13 SDOH — ECONOMIC STABILITY: FOOD INSECURITY: WITHIN THE PAST 12 MONTHS, YOU WORRIED THAT YOUR FOOD WOULD RUN OUT BEFORE YOU GOT THE MONEY TO BUY MORE.: NEVER TRUE

## 2025-08-13 SDOH — HEALTH STABILITY: MENTAL HEALTH: HOW MANY DRINKS CONTAINING ALCOHOL DO YOU HAVE ON A TYPICAL DAY WHEN YOU ARE DRINKING?: PATIENT DOES NOT DRINK

## 2025-08-13 SDOH — SOCIAL STABILITY: SOCIAL INSECURITY: WITHIN THE LAST YEAR, HAVE YOU BEEN AFRAID OF YOUR PARTNER OR EX-PARTNER?: NO

## 2025-08-13 SDOH — HEALTH STABILITY: MENTAL HEALTH: HOW OFTEN DO YOU HAVE A DRINK CONTAINING ALCOHOL?: NEVER

## 2025-08-13 SDOH — ECONOMIC STABILITY: FOOD INSECURITY: WITHIN THE PAST 12 MONTHS, THE FOOD YOU BOUGHT JUST DIDN'T LAST AND YOU DIDN'T HAVE MONEY TO GET MORE.: NEVER TRUE

## 2025-08-13 SDOH — ECONOMIC STABILITY: TRANSPORTATION INSECURITY: IN THE PAST 12 MONTHS, HAS LACK OF TRANSPORTATION KEPT YOU FROM MEDICAL APPOINTMENTS OR FROM GETTING MEDICATIONS?: NO

## 2025-08-13 SDOH — ECONOMIC STABILITY: HOUSING INSECURITY: AT ANY TIME IN THE PAST 12 MONTHS, WERE YOU HOMELESS OR LIVING IN A SHELTER (INCLUDING NOW)?: NO

## 2025-08-13 SDOH — ECONOMIC STABILITY: FOOD INSECURITY: HOW HARD IS IT FOR YOU TO PAY FOR THE VERY BASICS LIKE FOOD, HOUSING, MEDICAL CARE, AND HEATING?: NOT HARD AT ALL

## 2025-08-13 SDOH — HEALTH STABILITY: MENTAL HEALTH: HOW OFTEN DO YOU HAVE SIX OR MORE DRINKS ON ONE OCCASION?: NEVER

## 2025-08-13 ASSESSMENT — COGNITIVE AND FUNCTIONAL STATUS - GENERAL
PERSONAL GROOMING: A LITTLE
CLIMB 3 TO 5 STEPS WITH RAILING: A LITTLE
DRESSING REGULAR UPPER BODY CLOTHING: A LITTLE
DAILY ACTIVITIY SCORE: 20
MOVING TO AND FROM BED TO CHAIR: A LITTLE
WALKING IN HOSPITAL ROOM: A LITTLE
CLIMB 3 TO 5 STEPS WITH RAILING: A LOT
HELP NEEDED FOR BATHING: A LITTLE
MOBILITY SCORE: 18
MOVING TO AND FROM BED TO CHAIR: A LOT
DAILY ACTIVITIY SCORE: 17
CLIMB 3 TO 5 STEPS WITH RAILING: A LITTLE
DRESSING REGULAR UPPER BODY CLOTHING: A LITTLE
WALKING IN HOSPITAL ROOM: A LITTLE
TOILETING: A LITTLE
HELP NEEDED FOR BATHING: A LOT
DRESSING REGULAR LOWER BODY CLOTHING: A LOT
MOVING TO AND FROM BED TO CHAIR: A LOT
HELP NEEDED FOR BATHING: A LITTLE
MOBILITY SCORE: 18
DRESSING REGULAR LOWER BODY CLOTHING: A LITTLE
DRESSING REGULAR UPPER BODY CLOTHING: A LITTLE
MOBILITY SCORE: 17
DAILY ACTIVITIY SCORE: 20
TURNING FROM BACK TO SIDE WHILE IN FLAT BAD: A LITTLE
MOVING FROM LYING ON BACK TO SITTING ON SIDE OF FLAT BED WITH BEDRAILS: A LITTLE
STANDING UP FROM CHAIR USING ARMS: A LOT
WALKING IN HOSPITAL ROOM: A LITTLE
DRESSING REGULAR LOWER BODY CLOTHING: A LITTLE
TOILETING: A LITTLE
STANDING UP FROM CHAIR USING ARMS: A LITTLE
STANDING UP FROM CHAIR USING ARMS: A LOT
TOILETING: A LITTLE

## 2025-08-13 ASSESSMENT — ENCOUNTER SYMPTOMS
CONSTIPATION: 0
SORE THROAT: 0
APNEA: 0
MYALGIAS: 0
JOINT SWELLING: 0
FEVER: 0
SHORTNESS OF BREATH: 0
VOMITING: 1
DECREASED CONCENTRATION: 0
ABDOMINAL PAIN: 0
NAUSEA: 1
FATIGUE: 0
ACTIVITY CHANGE: 0
APPETITE CHANGE: 0
WOUND: 0
DIAPHORESIS: 0
AGITATION: 0
LIGHT-HEADEDNESS: 0
DYSURIA: 0
PALPITATIONS: 0
STRIDOR: 0
CHEST TIGHTNESS: 0
SINUS PAIN: 0
NERVOUS/ANXIOUS: 0
WHEEZING: 0
DIZZINESS: 0
RHINORRHEA: 0
ABDOMINAL DISTENTION: 0
WEAKNESS: 0
CHILLS: 1
FLANK PAIN: 0
COLOR CHANGE: 0
DIFFICULTY URINATING: 0
DIARRHEA: 0
ARTHRALGIAS: 0
NUMBNESS: 0
COUGH: 0
FREQUENCY: 0
HEMATURIA: 0
HEADACHES: 0
BACK PAIN: 0
CONFUSION: 1

## 2025-08-13 ASSESSMENT — PAIN - FUNCTIONAL ASSESSMENT
PAIN_FUNCTIONAL_ASSESSMENT: 0-10

## 2025-08-13 ASSESSMENT — LIFESTYLE VARIABLES
SKIP TO QUESTIONS 9-10: 1
AUDIT-C TOTAL SCORE: 0

## 2025-08-13 ASSESSMENT — PAIN SCALES - GENERAL
PAINLEVEL_OUTOF10: 0 - NO PAIN

## 2025-08-13 ASSESSMENT — ACTIVITIES OF DAILY LIVING (ADL)
BATHING_ASSISTANCE: MODERATE
ADL_ASSISTANCE: INDEPENDENT
LACK_OF_TRANSPORTATION: NO

## 2025-08-14 ASSESSMENT — COGNITIVE AND FUNCTIONAL STATUS - GENERAL
WALKING IN HOSPITAL ROOM: A LITTLE
MOVING TO AND FROM BED TO CHAIR: A LITTLE
STANDING UP FROM CHAIR USING ARMS: A LITTLE
CLIMB 3 TO 5 STEPS WITH RAILING: A LOT
MOBILITY SCORE: 19

## 2025-08-14 ASSESSMENT — PAIN DESCRIPTION - DESCRIPTORS
DESCRIPTORS: ACHING
DESCRIPTORS: ACHING

## 2025-08-14 ASSESSMENT — PAIN - FUNCTIONAL ASSESSMENT
PAIN_FUNCTIONAL_ASSESSMENT: 0-10

## 2025-08-14 ASSESSMENT — PAIN SCALES - GENERAL
PAINLEVEL_OUTOF10: 1
PAINLEVEL_OUTOF10: 0 - NO PAIN
PAINLEVEL_OUTOF10: 3
PAINLEVEL_OUTOF10: 3

## 2025-08-14 ASSESSMENT — PAIN DESCRIPTION - LOCATION: LOCATION: HEAD

## 2025-08-15 ENCOUNTER — APPOINTMENT (OUTPATIENT)
Dept: RADIOLOGY | Facility: HOSPITAL | Age: 60
DRG: 871 | End: 2025-08-15
Payer: COMMERCIAL

## 2025-08-15 ASSESSMENT — PAIN SCALES - GENERAL
PAINLEVEL_OUTOF10: 3
PAINLEVEL_OUTOF10: 1
PAINLEVEL_OUTOF10: 0 - NO PAIN

## 2025-08-15 ASSESSMENT — COGNITIVE AND FUNCTIONAL STATUS - GENERAL
DRESSING REGULAR UPPER BODY CLOTHING: A LITTLE
MOVING TO AND FROM BED TO CHAIR: A LITTLE
HELP NEEDED FOR BATHING: A LITTLE
DAILY ACTIVITIY SCORE: 18
HELP NEEDED FOR BATHING: A LOT
STANDING UP FROM CHAIR USING ARMS: A LOT
CLIMB 3 TO 5 STEPS WITH RAILING: A LITTLE
WALKING IN HOSPITAL ROOM: A LITTLE
HELP NEEDED FOR BATHING: A LITTLE
MOBILITY SCORE: 20
MOVING TO AND FROM BED TO CHAIR: A LOT
CLIMB 3 TO 5 STEPS WITH RAILING: A LITTLE
TOILETING: A LITTLE
WALKING IN HOSPITAL ROOM: A LITTLE
TOILETING: A LITTLE
DAILY ACTIVITIY SCORE: 20
DRESSING REGULAR UPPER BODY CLOTHING: A LITTLE
DRESSING REGULAR LOWER BODY CLOTHING: A LITTLE
DRESSING REGULAR LOWER BODY CLOTHING: A LOT
WALKING IN HOSPITAL ROOM: A LITTLE
MOVING TO AND FROM BED TO CHAIR: A LOT
DRESSING REGULAR UPPER BODY CLOTHING: A LITTLE
STANDING UP FROM CHAIR USING ARMS: A LITTLE
CLIMB 3 TO 5 STEPS WITH RAILING: A LITTLE
MOBILITY SCORE: 18
DAILY ACTIVITIY SCORE: 20
MOBILITY SCORE: 18
DRESSING REGULAR LOWER BODY CLOTHING: A LITTLE
STANDING UP FROM CHAIR USING ARMS: A LOT
TOILETING: A LITTLE

## 2025-08-15 ASSESSMENT — PAIN - FUNCTIONAL ASSESSMENT
PAIN_FUNCTIONAL_ASSESSMENT: 0-10

## 2025-08-15 ASSESSMENT — PAIN DESCRIPTION - LOCATION: LOCATION: HEAD

## 2025-08-15 ASSESSMENT — PAIN DESCRIPTION - DESCRIPTORS: DESCRIPTORS: ACHING

## 2025-08-15 ASSESSMENT — ACTIVITIES OF DAILY LIVING (ADL): HOME_MANAGEMENT_TIME_ENTRY: 42

## 2025-08-16 ASSESSMENT — COGNITIVE AND FUNCTIONAL STATUS - GENERAL
HELP NEEDED FOR BATHING: A LITTLE
DRESSING REGULAR LOWER BODY CLOTHING: A LITTLE
DAILY ACTIVITIY SCORE: 20
DRESSING REGULAR LOWER BODY CLOTHING: A LITTLE
STANDING UP FROM CHAIR USING ARMS: A LITTLE
DRESSING REGULAR UPPER BODY CLOTHING: A LITTLE
MOBILITY SCORE: 20
HELP NEEDED FOR BATHING: A LITTLE
DAILY ACTIVITIY SCORE: 20
WALKING IN HOSPITAL ROOM: A LITTLE
TOILETING: A LITTLE
DRESSING REGULAR UPPER BODY CLOTHING: A LITTLE
MOVING TO AND FROM BED TO CHAIR: A LITTLE
CLIMB 3 TO 5 STEPS WITH RAILING: A LITTLE
TOILETING: A LITTLE
MOBILITY SCORE: 24

## 2025-08-16 ASSESSMENT — PAIN DESCRIPTION - DESCRIPTORS: DESCRIPTORS: SORE

## 2025-08-16 ASSESSMENT — PAIN - FUNCTIONAL ASSESSMENT
PAIN_FUNCTIONAL_ASSESSMENT: 0-10

## 2025-08-16 ASSESSMENT — PAIN SCALES - GENERAL
PAINLEVEL_OUTOF10: 0 - NO PAIN
PAINLEVEL_OUTOF10: 2
PAINLEVEL_OUTOF10: 0 - NO PAIN

## 2025-08-17 ASSESSMENT — COGNITIVE AND FUNCTIONAL STATUS - GENERAL
STANDING UP FROM CHAIR USING ARMS: A LITTLE
TOILETING: A LITTLE
DRESSING REGULAR UPPER BODY CLOTHING: A LITTLE
WALKING IN HOSPITAL ROOM: A LITTLE
MOBILITY SCORE: 20
DRESSING REGULAR LOWER BODY CLOTHING: A LITTLE
CLIMB 3 TO 5 STEPS WITH RAILING: A LITTLE
DAILY ACTIVITIY SCORE: 20
MOVING TO AND FROM BED TO CHAIR: A LITTLE
HELP NEEDED FOR BATHING: A LITTLE

## 2025-08-17 ASSESSMENT — PAIN SCALES - GENERAL
PAINLEVEL_OUTOF10: 0 - NO PAIN

## 2025-08-17 ASSESSMENT — PAIN - FUNCTIONAL ASSESSMENT: PAIN_FUNCTIONAL_ASSESSMENT: 0-10

## 2025-08-18 ASSESSMENT — ACTIVITIES OF DAILY LIVING (ADL)
BATHING_COMMENTS: NO LOB
BATHING_LEVEL_OF_ASSISTANCE: CLOSE SUPERVISION

## 2025-08-18 ASSESSMENT — COGNITIVE AND FUNCTIONAL STATUS - GENERAL
DAILY ACTIVITIY SCORE: 22
DRESSING REGULAR LOWER BODY CLOTHING: A LITTLE
HELP NEEDED FOR BATHING: A LITTLE
WALKING IN HOSPITAL ROOM: A LITTLE
MOBILITY SCORE: 20
MOBILITY SCORE: 24
DAILY ACTIVITIY SCORE: 23
HELP NEEDED FOR BATHING: A LITTLE
DAILY ACTIVITIY SCORE: 21
DRESSING REGULAR LOWER BODY CLOTHING: A LITTLE
CLIMB 3 TO 5 STEPS WITH RAILING: A LITTLE
MOVING TO AND FROM BED TO CHAIR: A LITTLE
STANDING UP FROM CHAIR USING ARMS: A LITTLE
DRESSING REGULAR UPPER BODY CLOTHING: A LITTLE
DRESSING REGULAR LOWER BODY CLOTHING: A LITTLE

## 2025-08-18 ASSESSMENT — PAIN SCALES - GENERAL
PAINLEVEL_OUTOF10: 7
PAINLEVEL_OUTOF10: 0 - NO PAIN
PAINLEVEL_OUTOF10: 0 - NO PAIN

## 2025-08-18 ASSESSMENT — PAIN DESCRIPTION - LOCATION: LOCATION: LEG

## 2025-08-18 ASSESSMENT — PAIN DESCRIPTION - ORIENTATION: ORIENTATION: RIGHT

## 2025-08-18 ASSESSMENT — PAIN - FUNCTIONAL ASSESSMENT
PAIN_FUNCTIONAL_ASSESSMENT: 0-10
PAIN_FUNCTIONAL_ASSESSMENT: 0-10

## 2025-08-19 ASSESSMENT — PAIN - FUNCTIONAL ASSESSMENT
PAIN_FUNCTIONAL_ASSESSMENT: 0-10

## 2025-08-19 ASSESSMENT — COGNITIVE AND FUNCTIONAL STATUS - GENERAL
DAILY ACTIVITIY SCORE: 21
STANDING UP FROM CHAIR USING ARMS: A LITTLE
MOBILITY SCORE: 20
DRESSING REGULAR LOWER BODY CLOTHING: A LITTLE
MOVING TO AND FROM BED TO CHAIR: A LITTLE
MOBILITY SCORE: 20
CLIMB 3 TO 5 STEPS WITH RAILING: A LITTLE
TOILETING: A LITTLE
WALKING IN HOSPITAL ROOM: A LITTLE
WALKING IN HOSPITAL ROOM: A LITTLE
STANDING UP FROM CHAIR USING ARMS: A LITTLE
MOVING TO AND FROM BED TO CHAIR: A LITTLE
CLIMB 3 TO 5 STEPS WITH RAILING: A LITTLE
HELP NEEDED FOR BATHING: A LITTLE

## 2025-08-19 ASSESSMENT — PAIN SCALES - GENERAL
PAINLEVEL_OUTOF10: 3
PAINLEVEL_OUTOF10: 0 - NO PAIN
PAINLEVEL_OUTOF10: 7
PAINLEVEL_OUTOF10: 0 - NO PAIN
PAINLEVEL_OUTOF10: 0 - NO PAIN
PAINLEVEL_OUTOF10: 2
PAINLEVEL_OUTOF10: 0 - NO PAIN
PAINLEVEL_OUTOF10: 3

## 2025-08-19 ASSESSMENT — PAIN DESCRIPTION - ORIENTATION
ORIENTATION: RIGHT
ORIENTATION: RIGHT

## 2025-08-19 ASSESSMENT — PAIN DESCRIPTION - LOCATION
LOCATION: LEG
LOCATION: LEG

## 2025-08-20 ENCOUNTER — PHARMACY VISIT (OUTPATIENT)
Dept: PHARMACY | Facility: CLINIC | Age: 60
End: 2025-08-20
Payer: COMMERCIAL

## 2025-08-20 PROCEDURE — RXMED WILLOW AMBULATORY MEDICATION CHARGE

## 2025-08-20 RX ORDER — NALOXONE HYDROCHLORIDE 4 MG/.1ML
SPRAY NASAL
Qty: 2 EACH | Refills: 0 | OUTPATIENT
Start: 2025-08-20 | End: 2025-08-20 | Stop reason: HOSPADM

## 2025-08-20 ASSESSMENT — PAIN - FUNCTIONAL ASSESSMENT
PAIN_FUNCTIONAL_ASSESSMENT: 0-10

## 2025-08-20 ASSESSMENT — PAIN DESCRIPTION - LOCATION
LOCATION: LEG
LOCATION: LEG

## 2025-08-20 ASSESSMENT — COGNITIVE AND FUNCTIONAL STATUS - GENERAL
WALKING IN HOSPITAL ROOM: A LITTLE
MOBILITY SCORE: 20
STANDING UP FROM CHAIR USING ARMS: A LITTLE
CLIMB 3 TO 5 STEPS WITH RAILING: A LITTLE
MOVING TO AND FROM BED TO CHAIR: A LITTLE
DRESSING REGULAR LOWER BODY CLOTHING: A LITTLE
TOILETING: A LITTLE
DAILY ACTIVITIY SCORE: 20
HELP NEEDED FOR BATHING: A LOT

## 2025-08-20 ASSESSMENT — PAIN SCALES - GENERAL
PAINLEVEL_OUTOF10: 0 - NO PAIN
PAINLEVEL_OUTOF10: 3
PAINLEVEL_OUTOF10: 3
PAINLEVEL_OUTOF10: 2
PAINLEVEL_OUTOF10: 3
PAINLEVEL_OUTOF10: 7
PAINLEVEL_OUTOF10: 0 - NO PAIN
PAINLEVEL_OUTOF10: 2
PAINLEVEL_OUTOF10: 3

## 2025-08-20 ASSESSMENT — PAIN DESCRIPTION - ORIENTATION
ORIENTATION: RIGHT
ORIENTATION: RIGHT

## 2025-08-20 ASSESSMENT — ACTIVITIES OF DAILY LIVING (ADL): HOME_MANAGEMENT_TIME_ENTRY: 15

## 2025-08-21 ENCOUNTER — OFFICE VISIT (OUTPATIENT)
Dept: WOUND CARE | Facility: CLINIC | Age: 60
End: 2025-08-21
Payer: COMMERCIAL

## 2025-08-21 ENCOUNTER — PHARMACY VISIT (OUTPATIENT)
Dept: PHARMACY | Facility: CLINIC | Age: 60
End: 2025-08-21
Payer: COMMERCIAL

## 2025-08-21 PROCEDURE — 99213 OFFICE O/P EST LOW 20 MIN: CPT | Mod: 25

## 2025-08-21 PROCEDURE — 11042 DBRDMT SUBQ TIS 1ST 20SQCM/<: CPT

## 2025-08-21 PROCEDURE — RXMED WILLOW AMBULATORY MEDICATION CHARGE

## 2025-08-21 PROCEDURE — 11045 DBRDMT SUBQ TISS EACH ADDL: CPT

## 2025-08-21 RX ORDER — NALOXONE HYDROCHLORIDE 4 MG/.1ML
SPRAY NASAL
Qty: 2 EACH | Refills: 0 | OUTPATIENT
Start: 2025-08-21

## 2025-08-27 ENCOUNTER — OFFICE VISIT (OUTPATIENT)
Dept: WOUND CARE | Facility: CLINIC | Age: 60
End: 2025-08-27
Payer: COMMERCIAL

## 2025-08-27 PROCEDURE — 97598 DBRDMT OPN WND ADDL 20CM/<: CPT

## 2025-08-27 PROCEDURE — 97597 DBRDMT OPN WND 1ST 20 CM/<: CPT

## 2025-09-03 ENCOUNTER — OFFICE VISIT (OUTPATIENT)
Dept: WOUND CARE | Facility: CLINIC | Age: 60
End: 2025-09-03
Payer: COMMERCIAL

## 2025-09-03 PROCEDURE — 99213 OFFICE O/P EST LOW 20 MIN: CPT

## 2025-09-10 ENCOUNTER — APPOINTMENT (OUTPATIENT)
Dept: WOUND CARE | Facility: CLINIC | Age: 60
End: 2025-09-10
Payer: COMMERCIAL